# Patient Record
Sex: FEMALE | Race: BLACK OR AFRICAN AMERICAN | NOT HISPANIC OR LATINO
[De-identification: names, ages, dates, MRNs, and addresses within clinical notes are randomized per-mention and may not be internally consistent; named-entity substitution may affect disease eponyms.]

---

## 2017-12-27 ENCOUNTER — RESULT REVIEW (OUTPATIENT)
Age: 27
End: 2017-12-27

## 2018-07-25 ENCOUNTER — OUTPATIENT (OUTPATIENT)
Dept: OUTPATIENT SERVICES | Facility: HOSPITAL | Age: 28
LOS: 1 days | End: 2018-07-25
Payer: MEDICAID

## 2018-07-25 DIAGNOSIS — O26.899 OTHER SPECIFIED PREGNANCY RELATED CONDITIONS, UNSPECIFIED TRIMESTER: ICD-10-CM

## 2018-07-25 DIAGNOSIS — Z3A.00 WEEKS OF GESTATION OF PREGNANCY NOT SPECIFIED: ICD-10-CM

## 2018-07-25 PROCEDURE — 59025 FETAL NON-STRESS TEST: CPT | Mod: 26

## 2018-07-25 PROCEDURE — 99201 OFFICE OUTPATIENT NEW 10 MINUTES: CPT

## 2018-07-26 ENCOUNTER — INPATIENT (INPATIENT)
Facility: HOSPITAL | Age: 28
LOS: 1 days | Discharge: HOME | End: 2018-07-28
Attending: OBSTETRICS & GYNECOLOGY | Admitting: OBSTETRICS & GYNECOLOGY

## 2018-07-26 VITALS — WEIGHT: 189.6 LBS | HEIGHT: 67 IN

## 2018-07-26 DIAGNOSIS — O26.90 PREGNANCY RELATED CONDITIONS, UNSPECIFIED, UNSPECIFIED TRIMESTER: ICD-10-CM

## 2018-07-26 DIAGNOSIS — Z3A.00 WEEKS OF GESTATION OF PREGNANCY NOT SPECIFIED: ICD-10-CM

## 2018-07-26 LAB
ALBUMIN SERPL ELPH-MCNC: 3.6 G/DL — SIGNIFICANT CHANGE UP (ref 3.3–5)
ALP SERPL-CCNC: 163 U/L — HIGH (ref 40–120)
ALT FLD-CCNC: 6 U/L — SIGNIFICANT CHANGE UP (ref 4–33)
APTT BLD: 27.1 SEC — LOW (ref 27.5–37.4)
AST SERPL-CCNC: 23 U/L — SIGNIFICANT CHANGE UP (ref 4–32)
BASOPHILS # BLD AUTO: 0.03 K/UL — SIGNIFICANT CHANGE UP (ref 0–0.2)
BASOPHILS # BLD AUTO: 0.05 K/UL — SIGNIFICANT CHANGE UP (ref 0–0.2)
BASOPHILS NFR BLD AUTO: 0.2 % — SIGNIFICANT CHANGE UP (ref 0–2)
BASOPHILS NFR BLD AUTO: 0.4 % — SIGNIFICANT CHANGE UP (ref 0–2)
BILIRUB SERPL-MCNC: 0.3 MG/DL — SIGNIFICANT CHANGE UP (ref 0.2–1.2)
BLD GP AB SCN SERPL QL: NEGATIVE — SIGNIFICANT CHANGE UP
BUN SERPL-MCNC: 9 MG/DL — SIGNIFICANT CHANGE UP (ref 7–23)
CALCIUM SERPL-MCNC: 9.2 MG/DL — SIGNIFICANT CHANGE UP (ref 8.4–10.5)
CHLORIDE SERPL-SCNC: 100 MMOL/L — SIGNIFICANT CHANGE UP (ref 98–107)
CO2 SERPL-SCNC: 22 MMOL/L — SIGNIFICANT CHANGE UP (ref 22–31)
CREAT ?TM UR-MCNC: 66.3 MG/DL — SIGNIFICANT CHANGE UP
CREAT SERPL-MCNC: 0.67 MG/DL — SIGNIFICANT CHANGE UP (ref 0.5–1.3)
EOSINOPHIL # BLD AUTO: 0 K/UL — SIGNIFICANT CHANGE UP (ref 0–0.5)
EOSINOPHIL # BLD AUTO: 0 K/UL — SIGNIFICANT CHANGE UP (ref 0–0.5)
EOSINOPHIL NFR BLD AUTO: 0 % — SIGNIFICANT CHANGE UP (ref 0–6)
EOSINOPHIL NFR BLD AUTO: 0 % — SIGNIFICANT CHANGE UP (ref 0–6)
FIBRINOGEN PPP-MCNC: 654 MG/DL — HIGH (ref 310–510)
GLUCOSE SERPL-MCNC: 77 MG/DL — SIGNIFICANT CHANGE UP (ref 70–99)
HCT VFR BLD CALC: 35.4 % — SIGNIFICANT CHANGE UP (ref 34.5–45)
HCT VFR BLD CALC: 36.6 % — SIGNIFICANT CHANGE UP (ref 34.5–45)
HGB BLD-MCNC: 11.9 G/DL — SIGNIFICANT CHANGE UP (ref 11.5–15.5)
HGB BLD-MCNC: 12.5 G/DL — SIGNIFICANT CHANGE UP (ref 11.5–15.5)
IMM GRANULOCYTES # BLD AUTO: 0.2 # — SIGNIFICANT CHANGE UP
IMM GRANULOCYTES # BLD AUTO: 0.35 # — SIGNIFICANT CHANGE UP
IMM GRANULOCYTES NFR BLD AUTO: 1.6 % — HIGH (ref 0–1.5)
IMM GRANULOCYTES NFR BLD AUTO: 3 % — HIGH (ref 0–1.5)
INR BLD: 0.92 — SIGNIFICANT CHANGE UP (ref 0.88–1.17)
LDH SERPL L TO P-CCNC: 211 U/L — SIGNIFICANT CHANGE UP (ref 135–225)
LYMPHOCYTES # BLD AUTO: 0.87 K/UL — LOW (ref 1–3.3)
LYMPHOCYTES # BLD AUTO: 1.02 K/UL — SIGNIFICANT CHANGE UP (ref 1–3.3)
LYMPHOCYTES # BLD AUTO: 7.1 % — LOW (ref 13–44)
LYMPHOCYTES # BLD AUTO: 8.7 % — LOW (ref 13–44)
MCHC RBC-ENTMCNC: 33.6 % — SIGNIFICANT CHANGE UP (ref 32–36)
MCHC RBC-ENTMCNC: 33.8 PG — SIGNIFICANT CHANGE UP (ref 27–34)
MCHC RBC-ENTMCNC: 34.2 % — SIGNIFICANT CHANGE UP (ref 32–36)
MCHC RBC-ENTMCNC: 35.2 PG — HIGH (ref 27–34)
MCV RBC AUTO: 100.6 FL — HIGH (ref 80–100)
MCV RBC AUTO: 103.1 FL — HIGH (ref 80–100)
MONOCYTES # BLD AUTO: 0.65 K/UL — SIGNIFICANT CHANGE UP (ref 0–0.9)
MONOCYTES # BLD AUTO: 0.72 K/UL — SIGNIFICANT CHANGE UP (ref 0–0.9)
MONOCYTES NFR BLD AUTO: 5.3 % — SIGNIFICANT CHANGE UP (ref 2–14)
MONOCYTES NFR BLD AUTO: 6.1 % — SIGNIFICANT CHANGE UP (ref 2–14)
NEUTROPHILS # BLD AUTO: 10.5 K/UL — HIGH (ref 1.8–7.4)
NEUTROPHILS # BLD AUTO: 9.59 K/UL — HIGH (ref 1.8–7.4)
NEUTROPHILS NFR BLD AUTO: 81.8 % — HIGH (ref 43–77)
NEUTROPHILS NFR BLD AUTO: 85.8 % — HIGH (ref 43–77)
NRBC # FLD: 0 — SIGNIFICANT CHANGE UP
NRBC # FLD: 0 — SIGNIFICANT CHANGE UP
PLATELET # BLD AUTO: 162 K/UL — SIGNIFICANT CHANGE UP (ref 150–400)
PLATELET # BLD AUTO: 162 K/UL — SIGNIFICANT CHANGE UP (ref 150–400)
PMV BLD: 11.5 FL — SIGNIFICANT CHANGE UP (ref 7–13)
PMV BLD: 11.6 FL — SIGNIFICANT CHANGE UP (ref 7–13)
POTASSIUM SERPL-MCNC: 3.6 MMOL/L — SIGNIFICANT CHANGE UP (ref 3.5–5.3)
POTASSIUM SERPL-SCNC: 3.6 MMOL/L — SIGNIFICANT CHANGE UP (ref 3.5–5.3)
PROT SERPL-MCNC: 6.9 G/DL — SIGNIFICANT CHANGE UP (ref 6–8.3)
PROT UR-MCNC: 21 MG/DL — SIGNIFICANT CHANGE UP
PROTHROM AB SERPL-ACNC: 10.2 SEC — SIGNIFICANT CHANGE UP (ref 9.8–13.1)
RBC # BLD: 3.52 M/UL — LOW (ref 3.8–5.2)
RBC # BLD: 3.55 M/UL — LOW (ref 3.8–5.2)
RBC # FLD: 13.4 % — SIGNIFICANT CHANGE UP (ref 10.3–14.5)
RBC # FLD: 13.4 % — SIGNIFICANT CHANGE UP (ref 10.3–14.5)
RH IG SCN BLD-IMP: POSITIVE — SIGNIFICANT CHANGE UP
RH IG SCN BLD-IMP: POSITIVE — SIGNIFICANT CHANGE UP
SODIUM SERPL-SCNC: 139 MMOL/L — SIGNIFICANT CHANGE UP (ref 135–145)
T PALLIDUM AB TITR SER: NEGATIVE — SIGNIFICANT CHANGE UP
URATE SERPL-MCNC: 5.1 MG/DL — SIGNIFICANT CHANGE UP (ref 2.5–7)
WBC # BLD: 11.73 K/UL — HIGH (ref 3.8–10.5)
WBC # BLD: 12.25 K/UL — HIGH (ref 3.8–10.5)
WBC # FLD AUTO: 11.73 K/UL — HIGH (ref 3.8–10.5)
WBC # FLD AUTO: 12.25 K/UL — HIGH (ref 3.8–10.5)

## 2018-07-26 RX ORDER — DOCUSATE SODIUM 100 MG
100 CAPSULE ORAL
Qty: 0 | Refills: 0 | Status: DISCONTINUED | OUTPATIENT
Start: 2018-07-26 | End: 2018-07-28

## 2018-07-26 RX ORDER — OXYTOCIN 10 UNIT/ML
333.33 VIAL (ML) INJECTION
Qty: 20 | Refills: 0 | Status: COMPLETED | OUTPATIENT
Start: 2018-07-26 | End: 2018-07-26

## 2018-07-26 RX ORDER — PRAMOXINE HYDROCHLORIDE 150 MG/15G
1 AEROSOL, FOAM RECTAL EVERY 4 HOURS
Qty: 0 | Refills: 0 | Status: DISCONTINUED | OUTPATIENT
Start: 2018-07-26 | End: 2018-07-26

## 2018-07-26 RX ORDER — SODIUM CHLORIDE 9 MG/ML
1000 INJECTION, SOLUTION INTRAVENOUS ONCE
Qty: 0 | Refills: 0 | Status: COMPLETED | OUTPATIENT
Start: 2018-07-26 | End: 2018-07-26

## 2018-07-26 RX ORDER — HYDROCORTISONE 1 %
1 OINTMENT (GRAM) TOPICAL EVERY 4 HOURS
Qty: 0 | Refills: 0 | Status: DISCONTINUED | OUTPATIENT
Start: 2018-07-26 | End: 2018-07-27

## 2018-07-26 RX ORDER — SODIUM CHLORIDE 9 MG/ML
1000 INJECTION, SOLUTION INTRAVENOUS
Qty: 0 | Refills: 0 | Status: DISCONTINUED | OUTPATIENT
Start: 2018-07-26 | End: 2018-07-26

## 2018-07-26 RX ORDER — OXYTOCIN 10 UNIT/ML
41.67 VIAL (ML) INJECTION
Qty: 20 | Refills: 0 | Status: DISCONTINUED | OUTPATIENT
Start: 2018-07-26 | End: 2018-07-26

## 2018-07-26 RX ORDER — HYDROCORTISONE 1 %
1 OINTMENT (GRAM) TOPICAL EVERY 4 HOURS
Qty: 0 | Refills: 0 | Status: DISCONTINUED | OUTPATIENT
Start: 2018-07-26 | End: 2018-07-26

## 2018-07-26 RX ORDER — MAGNESIUM HYDROXIDE 400 MG/1
30 TABLET, CHEWABLE ORAL
Qty: 0 | Refills: 0 | Status: DISCONTINUED | OUTPATIENT
Start: 2018-07-26 | End: 2018-07-28

## 2018-07-26 RX ORDER — DIBUCAINE 1 %
1 OINTMENT (GRAM) RECTAL EVERY 4 HOURS
Qty: 0 | Refills: 0 | Status: DISCONTINUED | OUTPATIENT
Start: 2018-07-26 | End: 2018-07-28

## 2018-07-26 RX ORDER — ACETAMINOPHEN 500 MG
975 TABLET ORAL EVERY 6 HOURS
Qty: 0 | Refills: 0 | Status: DISCONTINUED | OUTPATIENT
Start: 2018-07-26 | End: 2018-07-28

## 2018-07-26 RX ORDER — CITRIC ACID/SODIUM CITRATE 300-500 MG
15 SOLUTION, ORAL ORAL EVERY 4 HOURS
Qty: 0 | Refills: 0 | Status: DISCONTINUED | OUTPATIENT
Start: 2018-07-26 | End: 2018-07-26

## 2018-07-26 RX ORDER — ACETAMINOPHEN 500 MG
975 TABLET ORAL EVERY 6 HOURS
Qty: 0 | Refills: 0 | Status: COMPLETED | OUTPATIENT
Start: 2018-07-26 | End: 2019-06-24

## 2018-07-26 RX ORDER — FAMOTIDINE 10 MG/ML
20 INJECTION INTRAVENOUS ONCE
Qty: 0 | Refills: 0 | Status: COMPLETED | OUTPATIENT
Start: 2018-07-26 | End: 2018-07-26

## 2018-07-26 RX ORDER — IBUPROFEN 200 MG
600 TABLET ORAL EVERY 6 HOURS
Qty: 0 | Refills: 0 | Status: COMPLETED | OUTPATIENT
Start: 2018-07-26 | End: 2019-06-24

## 2018-07-26 RX ORDER — OXYTOCIN 10 UNIT/ML
333.33 VIAL (ML) INJECTION
Qty: 20 | Refills: 0 | Status: COMPLETED | OUTPATIENT
Start: 2018-07-26

## 2018-07-26 RX ORDER — TETANUS TOXOID, REDUCED DIPHTHERIA TOXOID AND ACELLULAR PERTUSSIS VACCINE, ADSORBED 5; 2.5; 8; 8; 2.5 [IU]/.5ML; [IU]/.5ML; UG/.5ML; UG/.5ML; UG/.5ML
0.5 SUSPENSION INTRAMUSCULAR ONCE
Qty: 0 | Refills: 0 | Status: DISCONTINUED | OUTPATIENT
Start: 2018-07-26 | End: 2018-07-28

## 2018-07-26 RX ORDER — OXYCODONE HYDROCHLORIDE 5 MG/1
5 TABLET ORAL EVERY 4 HOURS
Qty: 0 | Refills: 0 | Status: DISCONTINUED | OUTPATIENT
Start: 2018-07-26 | End: 2018-07-28

## 2018-07-26 RX ORDER — LANOLIN
1 OINTMENT (GRAM) TOPICAL EVERY 6 HOURS
Qty: 0 | Refills: 0 | Status: DISCONTINUED | OUTPATIENT
Start: 2018-07-26 | End: 2018-07-28

## 2018-07-26 RX ORDER — PRAMOXINE HYDROCHLORIDE 150 MG/15G
1 AEROSOL, FOAM RECTAL EVERY 4 HOURS
Qty: 0 | Refills: 0 | Status: DISCONTINUED | OUTPATIENT
Start: 2018-07-26 | End: 2018-07-27

## 2018-07-26 RX ORDER — SODIUM CHLORIDE 9 MG/ML
3 INJECTION INTRAMUSCULAR; INTRAVENOUS; SUBCUTANEOUS EVERY 8 HOURS
Qty: 0 | Refills: 0 | Status: DISCONTINUED | OUTPATIENT
Start: 2018-07-26 | End: 2018-07-26

## 2018-07-26 RX ORDER — DIBUCAINE 1 %
1 OINTMENT (GRAM) RECTAL EVERY 4 HOURS
Qty: 0 | Refills: 0 | Status: DISCONTINUED | OUTPATIENT
Start: 2018-07-26 | End: 2018-07-26

## 2018-07-26 RX ORDER — IBUPROFEN 200 MG
600 TABLET ORAL EVERY 6 HOURS
Qty: 0 | Refills: 0 | Status: DISCONTINUED | OUTPATIENT
Start: 2018-07-26 | End: 2018-07-28

## 2018-07-26 RX ORDER — SODIUM CHLORIDE 9 MG/ML
3 INJECTION INTRAMUSCULAR; INTRAVENOUS; SUBCUTANEOUS EVERY 8 HOURS
Qty: 0 | Refills: 0 | Status: DISCONTINUED | OUTPATIENT
Start: 2018-07-26 | End: 2018-07-28

## 2018-07-26 RX ORDER — AER TRAVELER 0.5 G/1
1 SOLUTION RECTAL; TOPICAL EVERY 4 HOURS
Qty: 0 | Refills: 0 | Status: DISCONTINUED | OUTPATIENT
Start: 2018-07-26 | End: 2018-07-28

## 2018-07-26 RX ORDER — SODIUM CHLORIDE 9 MG/ML
1000 INJECTION INTRAMUSCULAR; INTRAVENOUS; SUBCUTANEOUS
Qty: 0 | Refills: 0 | Status: DISCONTINUED | OUTPATIENT
Start: 2018-07-26 | End: 2018-07-27

## 2018-07-26 RX ORDER — KETOROLAC TROMETHAMINE 30 MG/ML
30 SYRINGE (ML) INJECTION ONCE
Qty: 0 | Refills: 0 | Status: DISCONTINUED | OUTPATIENT
Start: 2018-07-26 | End: 2018-07-26

## 2018-07-26 RX ORDER — GLYCERIN ADULT
1 SUPPOSITORY, RECTAL RECTAL AT BEDTIME
Qty: 0 | Refills: 0 | Status: DISCONTINUED | OUTPATIENT
Start: 2018-07-26 | End: 2018-07-28

## 2018-07-26 RX ORDER — DIPHENHYDRAMINE HCL 50 MG
25 CAPSULE ORAL EVERY 6 HOURS
Qty: 0 | Refills: 0 | Status: DISCONTINUED | OUTPATIENT
Start: 2018-07-26 | End: 2018-07-28

## 2018-07-26 RX ORDER — AER TRAVELER 0.5 G/1
1 SOLUTION RECTAL; TOPICAL EVERY 4 HOURS
Qty: 0 | Refills: 0 | Status: DISCONTINUED | OUTPATIENT
Start: 2018-07-26 | End: 2018-07-26

## 2018-07-26 RX ORDER — SIMETHICONE 80 MG/1
80 TABLET, CHEWABLE ORAL EVERY 6 HOURS
Qty: 0 | Refills: 0 | Status: DISCONTINUED | OUTPATIENT
Start: 2018-07-26 | End: 2018-07-28

## 2018-07-26 RX ORDER — OXYCODONE HYDROCHLORIDE 5 MG/1
5 TABLET ORAL
Qty: 0 | Refills: 0 | Status: DISCONTINUED | OUTPATIENT
Start: 2018-07-26 | End: 2018-07-28

## 2018-07-26 RX ADMIN — Medication 600 MILLIGRAM(S): at 21:50

## 2018-07-26 RX ADMIN — SODIUM CHLORIDE 2000 MILLILITER(S): 9 INJECTION, SOLUTION INTRAVENOUS at 03:00

## 2018-07-26 RX ADMIN — Medication 1000 MILLIUNIT(S)/MIN: at 13:56

## 2018-07-26 RX ADMIN — Medication 15 MILLILITER(S): at 05:47

## 2018-07-26 RX ADMIN — Medication 975 MILLIGRAM(S): at 21:27

## 2018-07-26 RX ADMIN — Medication 600 MILLIGRAM(S): at 21:27

## 2018-07-26 RX ADMIN — Medication 125 MILLIUNIT(S)/MIN: at 13:57

## 2018-07-26 RX ADMIN — SODIUM CHLORIDE 125 MILLILITER(S): 9 INJECTION, SOLUTION INTRAVENOUS at 05:47

## 2018-07-26 RX ADMIN — FAMOTIDINE 20 MILLIGRAM(S): 10 INJECTION INTRAVENOUS at 08:40

## 2018-07-26 RX ADMIN — Medication 975 MILLIGRAM(S): at 21:50

## 2018-07-26 RX ADMIN — Medication 30 MILLIGRAM(S): at 15:19

## 2018-07-27 ENCOUNTER — TRANSCRIPTION ENCOUNTER (OUTPATIENT)
Age: 28
End: 2018-07-27

## 2018-07-27 RX ORDER — PRAMOXINE HYDROCHLORIDE 150 MG/15G
1 AEROSOL, FOAM RECTAL EVERY 4 HOURS
Qty: 0 | Refills: 0 | Status: DISCONTINUED | OUTPATIENT
Start: 2018-07-27 | End: 2018-07-28

## 2018-07-27 RX ORDER — HYDROCORTISONE 1 %
1 OINTMENT (GRAM) TOPICAL EVERY 4 HOURS
Qty: 0 | Refills: 0 | Status: DISCONTINUED | OUTPATIENT
Start: 2018-07-27 | End: 2018-07-28

## 2018-07-27 RX ADMIN — Medication 600 MILLIGRAM(S): at 18:10

## 2018-07-27 RX ADMIN — Medication 600 MILLIGRAM(S): at 05:01

## 2018-07-27 RX ADMIN — Medication 975 MILLIGRAM(S): at 11:32

## 2018-07-27 RX ADMIN — SODIUM CHLORIDE 3 MILLILITER(S): 9 INJECTION INTRAMUSCULAR; INTRAVENOUS; SUBCUTANEOUS at 21:37

## 2018-07-27 RX ADMIN — Medication 975 MILLIGRAM(S): at 05:40

## 2018-07-27 RX ADMIN — Medication 975 MILLIGRAM(S): at 05:01

## 2018-07-27 RX ADMIN — Medication 975 MILLIGRAM(S): at 19:00

## 2018-07-27 RX ADMIN — Medication 600 MILLIGRAM(S): at 19:00

## 2018-07-27 RX ADMIN — SODIUM CHLORIDE 3 MILLILITER(S): 9 INJECTION INTRAMUSCULAR; INTRAVENOUS; SUBCUTANEOUS at 06:00

## 2018-07-27 RX ADMIN — Medication 975 MILLIGRAM(S): at 18:10

## 2018-07-27 RX ADMIN — Medication 975 MILLIGRAM(S): at 12:00

## 2018-07-27 RX ADMIN — Medication 600 MILLIGRAM(S): at 05:40

## 2018-07-27 RX ADMIN — Medication 600 MILLIGRAM(S): at 12:00

## 2018-07-27 RX ADMIN — Medication 600 MILLIGRAM(S): at 11:33

## 2018-07-27 NOTE — PROGRESS NOTE ADULT - ASSESSMENT
A: 28 year old, postpartum day #1 following  doing well.   -Continue routine postpartum care.  -Discharge home on postpartum day #2.    CAMDEN Casas MD

## 2018-07-27 NOTE — DISCHARGE NOTE OB - CARE PROVIDER_API CALL
Mandy Westbrook), Obstetrics and Gynecology  [address]  [address]  [place] [address]  Phone: [phone]  Fax: [phone]

## 2018-07-27 NOTE — DISCHARGE NOTE OB - PATIENT PORTAL LINK FT
You can access the [employer] (MM)[facility] Patient Portal, offered by [facility], by registering with the following website: [email or web address]

## 2018-07-27 NOTE — DISCHARGE NOTE OB - MEDICATION SUMMARY - MEDICATIONS TO TAKE
I will START or STAY ON the medications listed below when I get home from the hospital:    acetaminophen 325 mg oral tablet  -- 3 tab(s) by mouth every 6 hours, As Needed  -- Indication: For Vaginal delivery    ibuprofen 600 mg oral tablet  -- 1 tab(s) by mouth every 6 hours, As Needed  -- Indication: For Vaginal delivery    Prenatal Multivitamins with Folic Acid 1 mg oral tablet  -- 1 tab(s) by mouth once a day  -- Indication: For Vaginal delivery

## 2018-07-27 NOTE — PROGRESS NOTE ADULT - SUBJECTIVE AND OBJECTIVE BOX
Anesthesia Post-op Note    POD#1 S/P     Patient is doing well.  OOBAA. Tolerating clears.  Pain is tolerable. Denies HA. Denies numbness/tingliness/pain in LE.  No residual anesthetic issues or complications noted.  T(C): 36.7 (18 @ 06:20), Max: 37 (18 @ 12:43)  HR: 81 (18 @ 06:20) (81 - 109)  BP: 121/85 (18 @ 06:20) (108/64 - 132/86)  RR: 16 (18 @ 06:20) (16 - 18)  SpO2: 97% (18 @ 06:20) (97% - 100%)

## 2018-07-27 NOTE — PROGRESS NOTE ADULT - SUBJECTIVE AND OBJECTIVE BOX
S: Patient doing well.   Pain controlled.  Out of bed.  Voiding.  Tolerating oral intakd.  Lochia within normal limits.    O: Vital Signs Last 24 Hours  T(C): 36.7 (27 Jul 2018 06:20), Max: 37 (26 Jul 2018 12:43)  T(F): 98.1 (27 Jul 2018 06:20), Max: 98.6 (26 Jul 2018 12:43)  HR: 81 (27 Jul 2018 06:20) (81 - 109)  BP: 121/85 (27 Jul 2018 06:20) (108/64 - 132/86)  BP(mean): --  RR: 16 (27 Jul 2018 06:20) (16 - 18)  SpO2: 97% (27 Jul 2018 06:20) (97% - 100%)    General: doing well, appears in no distress  Abdomen: soft, nontender, not distended.    Fundus firm below umbilicus, nontender.  Extremities: no tenderness, negative Chris's sign    Labs:                        12.5   12.25 )-----------( 162      ( 26 Jul 2018 08:07 )             36.6       ABO Interpretation: AB (07-26 @ 03:07)    Rh Interpretation: Positive (07-26 @ 03:07)    Antibody Screen Negative          . S: Patient doing well.   Pain controlled.  Out of bed.  Voiding.  Tolerating oral intake.  Lochia within normal limits.    O: Vital Signs Last 24 Hours  T(C): 36.7 (27 Jul 2018 06:20), Max: 37 (26 Jul 2018 12:43)  T(F): 98.1 (27 Jul 2018 06:20), Max: 98.6 (26 Jul 2018 12:43)  HR: 81 (27 Jul 2018 06:20) (81 - 109)  BP: 121/85 (27 Jul 2018 06:20) (108/64 - 132/86)  BP(mean): --  RR: 16 (27 Jul 2018 06:20) (16 - 18)  SpO2: 97% (27 Jul 2018 06:20) (97% - 100%)    General: doing well, appears in no distress  Abdomen: soft, nontender, not distended.    Fundus firm below umbilicus, nontender.  Extremities: no tenderness, negative Chris's sign    Labs:                        12.5   12.25 )-----------( 162      ( 26 Jul 2018 08:07 )             36.6       ABO Interpretation: AB (07-26 @ 03:07)    Rh Interpretation: Positive (07-26 @ 03:07)    Antibody Screen Negative          .

## 2018-07-27 NOTE — DISCHARGE NOTE OB - CARE PLAN
Principal Discharge DX:	Vaginal delivery  Goal:	normal recovery  Assessment and plan of treatment:	normal delivery. mom has uneventful pospartum course, baby to NICU

## 2018-07-28 VITALS
SYSTOLIC BLOOD PRESSURE: 127 MMHG | DIASTOLIC BLOOD PRESSURE: 88 MMHG | OXYGEN SATURATION: 100 % | RESPIRATION RATE: 20 BRPM | HEART RATE: 94 BPM | TEMPERATURE: 99 F

## 2018-07-28 RX ORDER — ACETAMINOPHEN 500 MG
3 TABLET ORAL
Qty: 0 | Refills: 0 | DISCHARGE
Start: 2018-07-28

## 2018-07-28 RX ORDER — IBUPROFEN 200 MG
1 TABLET ORAL
Qty: 0 | Refills: 0 | DISCHARGE
Start: 2018-07-28

## 2018-07-28 RX ADMIN — Medication 600 MILLIGRAM(S): at 02:31

## 2018-07-28 RX ADMIN — Medication 975 MILLIGRAM(S): at 01:31

## 2018-07-28 RX ADMIN — Medication 600 MILLIGRAM(S): at 01:31

## 2018-07-28 RX ADMIN — Medication 600 MILLIGRAM(S): at 06:12

## 2018-07-28 RX ADMIN — Medication 975 MILLIGRAM(S): at 07:11

## 2018-07-28 RX ADMIN — Medication 600 MILLIGRAM(S): at 07:11

## 2018-07-28 RX ADMIN — Medication 975 MILLIGRAM(S): at 02:31

## 2018-07-28 RX ADMIN — Medication 975 MILLIGRAM(S): at 06:11

## 2018-07-28 NOTE — PROGRESS NOTE ADULT - SUBJECTIVE AND OBJECTIVE BOX
OB Progress Note:  PPD#2    S: 29yo PPD#2 s/p  with normal EBL. Patient feels well. Pain is well controlled. She is tolerating a regular diet, voiding spontaneously, and ambulating without difficulty. Denies lightheadedness/dizziness. Denies N/V.     O:  Vitals:   Vital Signs Last 24 Hrs  T(C): 37 (2018 06:14), Max: 37 (2018 06:14)  T(F): 98.6 (2018 06:14), Max: 98.6 (2018 06:14)  HR: 94 (2018 06:14) (94 - 98)  BP: 127/88 (2018 06:14) (127/88 - 130/87)  BP(mean): --  RR: 20 (2018 06:14) (18 - 20)  SpO2: 100% (2018 06:14) (100% - 100%)    MEDICATIONS  (STANDING):  acetaminophen   Tablet. 975 milliGRAM(s) Oral every 6 hours  diphtheria/tetanus/pertussis (acellular) Vaccine (ADAcel) 0.5 milliLiter(s) IntraMuscular once  ibuprofen  Tablet 600 milliGRAM(s) Oral every 6 hours  oxyCODONE    IR 5 milliGRAM(s) Oral every 3 hours  prenatal multivitamin 1 Tablet(s) Oral daily  sodium chloride 0.9% lock flush 3 milliLiter(s) IV Push every 8 hours    MEDICATIONS  (PRN):  dibucaine 1% Ointment 1 Application(s) Topical every 4 hours PRN Perineal Discomfort  diphenhydrAMINE   Capsule 25 milliGRAM(s) Oral every 6 hours PRN Itching  docusate sodium 100 milliGRAM(s) Oral two times a day PRN Stool Softening  glycerin Suppository - Adult 1 Suppository(s) Rectal at bedtime PRN Constipation  hydrocortisone 1% Cream 1 Application(s) Topical every 4 hours PRN perineal discomfort  lanolin Ointment 1 Application(s) Topical every 6 hours PRN Sore Nipples  magnesium hydroxide Suspension 30 milliLiter(s) Oral two times a day PRN Constipation  oxyCODONE    IR 5 milliGRAM(s) Oral every 4 hours PRN Severe Pain (7 -10)  pramoxine 1%/zinc 5% Cream 1 Application(s) Topical every 4 hours PRN perineal discomfort  simethicone 80 milliGRAM(s) Chew every 6 hours PRN Gas  witch hazel Pads 1 Application(s) Topical every 4 hours PRN Perineal Discomfort      Labs:  Blood type: AB Positive  Rubella IgG: RPR: Negative                          12.5   12.25<H> >-----------< 162    (  @ 08:07 )             36.6                        11.9   11.73<H> >-----------< 162    (  @ 03:00 )             35.4    18 @ 08:07      139  |  100  |  9   ----------------------------<  77  3.6   |  22  |  0.67        Ca    9.2      2018 08:07    TPro  6.9  /  Alb  3.6  /  TBili  0.3  /  DBili  x   /  AST  23  /  ALT  6   /  AlkPhos  163<H>  18 @ 08:07      Physical Exam:  General: NAD  Abdomen: soft, non-tender, non-distended, fundus firm  Vaginal: Lochia wnl  Extremities: No erythema/edema

## 2018-07-28 NOTE — PROGRESS NOTE ADULT - SUBJECTIVE AND OBJECTIVE BOX
S: 29yo PPD#2 s/p  with PEC.  Hellp labs negative (). Patient feels well. Pain is well controlled. She is tolerating a regular diet and passing flatus. She is voiding spontaneously, and ambulating without difficulty. Denies CP/SOB. Denies lightheadedness/dizziness. Denies N/V.  Patient denies headaches, blurred vision, or epigastric pain.    O:  Vitals:   Vital Signs Last 24 Hrs  T(C): 37 (2018 06:14), Max: 37 (2018 06:14)  T(F): 98.6 (2018 06:14), Max: 98.6 (2018 06:14)  HR: 94 (2018 06:14) (94 - 98)  BP: 127/88 (2018 06:14) (127/88 - 130/87)  BP(mean): --  RR: 20 (2018 06:14) (18 - 20)  SpO2: 100% (2018 06:14) (100% - 100%)    MEDICATIONS  (STANDING):  acetaminophen   Tablet. 975 milliGRAM(s) Oral every 6 hours  diphtheria/tetanus/pertussis (acellular) Vaccine (ADAcel) 0.5 milliLiter(s) IntraMuscular once  ibuprofen  Tablet 600 milliGRAM(s) Oral every 6 hours  oxyCODONE    IR 5 milliGRAM(s) Oral every 3 hours  prenatal multivitamin 1 Tablet(s) Oral daily  sodium chloride 0.9% lock flush 3 milliLiter(s) IV Push every 8 hours    MEDICATIONS  (PRN):  dibucaine 1% Ointment 1 Application(s) Topical every 4 hours PRN Perineal Discomfort  diphenhydrAMINE   Capsule 25 milliGRAM(s) Oral every 6 hours PRN Itching  docusate sodium 100 milliGRAM(s) Oral two times a day PRN Stool Softening  glycerin Suppository - Adult 1 Suppository(s) Rectal at bedtime PRN Constipation  hydrocortisone 1% Cream 1 Application(s) Topical every 4 hours PRN perineal discomfort  lanolin Ointment 1 Application(s) Topical every 6 hours PRN Sore Nipples  magnesium hydroxide Suspension 30 milliLiter(s) Oral two times a day PRN Constipation  oxyCODONE    IR 5 milliGRAM(s) Oral every 4 hours PRN Severe Pain (7 -10)  pramoxine 1%/zinc 5% Cream 1 Application(s) Topical every 4 hours PRN perineal discomfort  simethicone 80 milliGRAM(s) Chew every 6 hours PRN Gas  witch hazel Pads 1 Application(s) Topical every 4 hours PRN Perineal Discomfort      Labs:  Blood type: AB Positive  Rubella IgG: RPR: Negative                          12.5   12.25<H> >-----------< 162    (  @ 08:07 )             36.6                        11.9   11.73<H> >-----------< 162    (  @ 03:00 )             35.4    18 @ 08:07      139  |  100  |  9   ----------------------------<  77  3.6   |  22  |  0.67        Ca    9.2      2018 08:07    TPro  6.9  /  Alb  3.6  /  TBili  0.3  /  DBili  x   /  AST  23  /  ALT  6   /  AlkPhos  163<H>  18 @ 08:07      Physical Exam:  General: NAD  Abdomen: soft, non-tender, non-distended, fundus firm  Vaginal: Lochia wnl  Extremities: No erythema/edema    A/P: 29yo PPD#2 s/p .  Patient is stable and doing well post-partum.  History of PEC.  Hellp labs negative (). Patient denies headaches, blurred vision, or epigastric pain.      - Pain well controlled, continue current pain regimen  - Increase ambulation, SCDs when not ambulating  - Continue regular diet  - Discharge planned for today.    -Lillian ANDERS-BC

## 2018-07-28 NOTE — PROGRESS NOTE ADULT - PROBLEM SELECTOR PLAN 1
- Pain well controlled, continue current pain regimen  - Increase ambulation, SCDs when not ambulating  - Continue regular diet  - Discharge planning     Belkis Jorgensen, PGY-1  Pager# [phone]

## 2018-10-12 ENCOUNTER — RESULT REVIEW (OUTPATIENT)
Age: 28
End: 2018-10-12

## 2020-02-21 NOTE — PATIENT PROFILE OB - RESUSCITATION EFFORTS, BABY A
Visit Information    Have you changed or started any medications since your last visit including any over-the-counter medicines, vitamins, or herbal medicines? no   Are you having any side effects from any of your medications? -  no  Have you stopped taking any of your medications? Is so, why? -  no    Have you seen any other physician or provider since your last visit? No  Have you had any other diagnostic tests since your last visit? No  Have you been seen in the emergency room and/or had an admission to a hospital since we last saw you? No  Have you had your routine dental cleaning in the past 6 months? no    Have you activated your [email or web address] account? If not, what are your barriers?  Yes     Patient Care Team:  Fredrick Randolph PA-C as PCP - General (Physician Assistant)  Fredrick Randolph PA-C as PCP - [facility]  Miriam Tsang MD as Consulting Physician (Urology)  Mary Bahena MD as Consulting Physician (Nephrology)    Medical History Review  Past Medical, Family, and Social History reviewed and does not contribute to the patient presenting condition    Health Maintenance   Topic Date Due    Lipid screen  04/17/2019    Annual Wellness Visit (AWV)  06/19/2019    DTaP/Tdap/Td vaccine (1 - Tdap) 01/14/2021 (Originally 4/24/1957)    Flu vaccine (1) 01/14/2021 (Originally 9/1/2019)    Shingles Vaccine (1 of 2) 01/14/2021 (Originally 4/24/1997)    Potassium monitoring  01/03/2021    Creatinine monitoring  01/03/2021    Colon cancer screen colonoscopy  04/17/2022    Pneumococcal 65+ years Vaccine  Completed    Hepatitis C screen  Completed    Hepatitis A vaccine  Aged Out    Hepatitis B vaccine  Aged Out    Hib vaccine  Aged Out    Meningococcal (ACWY) vaccine  Aged Out
Filippo Montano MD as Consulting Physician (Urology)  Drea Barnes MD as Consulting Physician (Nephrology)    Wt Readings from Last 3 Encounters:   02/21/20 202 lb 9.6 oz (91.9 kg)   01/14/20 203 lb 3.2 oz (92.2 kg)   01/03/20 201 lb (91.2 kg)     Vitals:    02/21/20 0910   BP: 134/79   Site: Left Upper Arm   Position: Sitting   Cuff Size: Medium Adult   Pulse: 95   Temp: 96.5 °F (35.8 °C)   TempSrc: Tympanic   Weight: 202 lb 9.6 oz (91.9 kg)   Height: 5' 9\" (1.753 m)     Body mass index is 29.92 kg/m². Based upon direct observation of the patient, evaluation of cognition reveals recent and remote memory intact. General Appearance: alert and oriented to person, place and time, well-developed and well-nourished, in no acute distress  Skin: warm and dry, no rash or erythema  Head: normocephalic and atraumatic  Eyes: pupils equal, round, and reactive to light and conjunctivae normal  ENT: hearing grossly normal bilaterally and oropharynx clear and moist with normal mucous membranes  Pulmonary/Chest: clear to auscultation bilaterally- no wheezes, rales or rhonchi, normal air movement, no respiratory distress  Cardiovascular: normal rate, normal S1 and S2 and no gallops  Abdomen: soft, non-tender and non-distended  Extremities: no cyanosis and no clubbing  Musculoskeletal: normal range of motion, no joint swelling, deformity or tenderness  Neurologic: gait and coordination normal and speech normal    Patient's complete Health Risk Assessment and screening values have been reviewed and are found in Flowsheets. The following problems were reviewed today and where indicated follow up appointments were made and/or referrals ordered. Patient is seen by urology, informed patient will obtain most recent note. Patient will see nephrology on 2/26/2020, informed patient will obtain most recent note. Inform patient labs will be ordered and have completed the next several weeks, patient voiced understanding.       Positive Risk
No

## 2020-04-26 ENCOUNTER — MESSAGE (OUTPATIENT)
Age: 30
End: 2020-04-26

## 2021-04-27 PROBLEM — Z00.00 ENCOUNTER FOR PREVENTIVE HEALTH EXAMINATION: Status: ACTIVE | Noted: 2021-04-27

## 2021-04-29 ENCOUNTER — APPOINTMENT (OUTPATIENT)
Dept: ANTEPARTUM | Facility: CLINIC | Age: 31
End: 2021-04-29
Payer: MEDICAID

## 2021-04-29 ENCOUNTER — ASOB RESULT (OUTPATIENT)
Age: 31
End: 2021-04-29

## 2021-04-29 PROCEDURE — 76813 OB US NUCHAL MEAS 1 GEST: CPT

## 2021-04-29 PROCEDURE — 99072 ADDL SUPL MATRL&STAF TM PHE: CPT

## 2021-05-04 LAB
1ST TRIMESTER DATA: NORMAL
ADDENDUM DOC: NORMAL
AFP PNL SERPL: NORMAL
AFP SERPL-ACNC: NORMAL
CLINICAL BIOCHEMIST REVIEW: NORMAL
FREE BETA HCG 1ST TRIMESTER: NORMAL
Lab: NORMAL
NOTES NTD: NORMAL
NT: NORMAL
PAPP-A SERPL-ACNC: NORMAL
TRISOMY 18/3: NORMAL

## 2021-06-30 ENCOUNTER — ASOB RESULT (OUTPATIENT)
Age: 31
End: 2021-06-30

## 2021-06-30 ENCOUNTER — APPOINTMENT (OUTPATIENT)
Dept: ANTEPARTUM | Facility: CLINIC | Age: 31
End: 2021-06-30
Payer: MEDICAID

## 2021-06-30 ENCOUNTER — TRANSCRIPTION ENCOUNTER (OUTPATIENT)
Age: 31
End: 2021-06-30

## 2021-06-30 PROCEDURE — 76817 TRANSVAGINAL US OBSTETRIC: CPT

## 2021-06-30 PROCEDURE — 76805 OB US >/= 14 WKS SNGL FETUS: CPT

## 2021-07-15 ENCOUNTER — ASOB RESULT (OUTPATIENT)
Age: 31
End: 2021-07-15

## 2021-07-15 ENCOUNTER — APPOINTMENT (OUTPATIENT)
Dept: ANTEPARTUM | Facility: CLINIC | Age: 31
End: 2021-07-15
Payer: MEDICAID

## 2021-07-15 PROCEDURE — 76817 TRANSVAGINAL US OBSTETRIC: CPT

## 2021-08-08 ENCOUNTER — OUTPATIENT (OUTPATIENT)
Dept: INPATIENT UNIT | Facility: HOSPITAL | Age: 31
LOS: 1 days | Discharge: HOME | End: 2021-08-08
Payer: MEDICAID

## 2021-08-08 ENCOUNTER — EMERGENCY (EMERGENCY)
Facility: HOSPITAL | Age: 31
LOS: 0 days | Discharge: HOME | End: 2021-08-08
Attending: EMERGENCY MEDICINE
Payer: MEDICAID

## 2021-08-08 VITALS
OXYGEN SATURATION: 100 % | TEMPERATURE: 98 F | HEART RATE: 81 BPM | DIASTOLIC BLOOD PRESSURE: 78 MMHG | RESPIRATION RATE: 18 BRPM | SYSTOLIC BLOOD PRESSURE: 117 MMHG

## 2021-08-08 VITALS — HEIGHT: 67 IN | WEIGHT: 197.09 LBS

## 2021-08-08 DIAGNOSIS — R06.02 SHORTNESS OF BREATH: ICD-10-CM

## 2021-08-08 DIAGNOSIS — O99.891 OTHER SPECIFIED DISEASES AND CONDITIONS COMPLICATING PREGNANCY: ICD-10-CM

## 2021-08-08 DIAGNOSIS — R42 DIZZINESS AND GIDDINESS: ICD-10-CM

## 2021-08-08 DIAGNOSIS — Z3A.27 27 WEEKS GESTATION OF PREGNANCY: ICD-10-CM

## 2021-08-08 DIAGNOSIS — R00.2 PALPITATIONS: ICD-10-CM

## 2021-08-08 DIAGNOSIS — O26.899 OTHER SPECIFIED PREGNANCY RELATED CONDITIONS, UNSPECIFIED TRIMESTER: ICD-10-CM

## 2021-08-08 LAB
ALBUMIN SERPL ELPH-MCNC: 3 G/DL — LOW (ref 3.3–5)
ALP SERPL-CCNC: 87 U/L — SIGNIFICANT CHANGE UP (ref 40–120)
ALT FLD-CCNC: 13 U/L — SIGNIFICANT CHANGE UP (ref 12–78)
ANION GAP SERPL CALC-SCNC: 6 MMOL/L — SIGNIFICANT CHANGE UP (ref 5–17)
APPEARANCE UR: ABNORMAL
APTT BLD: 26.3 SEC — LOW (ref 27.5–35.5)
AST SERPL-CCNC: 20 U/L — SIGNIFICANT CHANGE UP (ref 15–37)
BASOPHILS # BLD AUTO: 0.18 K/UL — SIGNIFICANT CHANGE UP (ref 0–0.2)
BASOPHILS NFR BLD AUTO: 2 % — SIGNIFICANT CHANGE UP (ref 0–2)
BILIRUB SERPL-MCNC: 0.2 MG/DL — SIGNIFICANT CHANGE UP (ref 0.2–1.2)
BILIRUB UR-MCNC: NEGATIVE — SIGNIFICANT CHANGE UP
BUN SERPL-MCNC: 5 MG/DL — LOW (ref 7–23)
CALCIUM SERPL-MCNC: 9.2 MG/DL — SIGNIFICANT CHANGE UP (ref 8.5–10.1)
CHLORIDE SERPL-SCNC: 110 MMOL/L — HIGH (ref 96–108)
CO2 SERPL-SCNC: 23 MMOL/L — SIGNIFICANT CHANGE UP (ref 22–31)
COLOR SPEC: YELLOW — SIGNIFICANT CHANGE UP
CREAT ?TM UR-MCNC: 88 MG/DL — SIGNIFICANT CHANGE UP
CREAT SERPL-MCNC: 0.46 MG/DL — LOW (ref 0.5–1.3)
DIFF PNL FLD: NEGATIVE — SIGNIFICANT CHANGE UP
EOSINOPHIL # BLD AUTO: 0 K/UL — SIGNIFICANT CHANGE UP (ref 0–0.5)
EOSINOPHIL NFR BLD AUTO: 0 % — SIGNIFICANT CHANGE UP (ref 0–6)
GLUCOSE SERPL-MCNC: 104 MG/DL — HIGH (ref 70–99)
GLUCOSE UR QL: 100 MG/DL
HCT VFR BLD CALC: 30 % — LOW (ref 34.5–45)
HGB BLD-MCNC: 10 G/DL — LOW (ref 11.5–15.5)
INR BLD: 0.97 RATIO — SIGNIFICANT CHANGE UP (ref 0.88–1.16)
KETONES UR-MCNC: ABNORMAL
LDH SERPL L TO P-CCNC: 262 U/L — HIGH (ref 84–241)
LEUKOCYTE ESTERASE UR-ACNC: NEGATIVE — SIGNIFICANT CHANGE UP
LYMPHOCYTES # BLD AUTO: 1.15 K/UL — SIGNIFICANT CHANGE UP (ref 1–3.3)
LYMPHOCYTES # BLD AUTO: 13 % — SIGNIFICANT CHANGE UP (ref 13–44)
MCHC RBC-ENTMCNC: 32.3 PG — SIGNIFICANT CHANGE UP (ref 27–34)
MCHC RBC-ENTMCNC: 33.3 GM/DL — SIGNIFICANT CHANGE UP (ref 32–36)
MCV RBC AUTO: 96.8 FL — SIGNIFICANT CHANGE UP (ref 80–100)
MONOCYTES # BLD AUTO: 0.44 K/UL — SIGNIFICANT CHANGE UP (ref 0–0.9)
MONOCYTES NFR BLD AUTO: 5 % — SIGNIFICANT CHANGE UP (ref 2–14)
NEUTROPHILS # BLD AUTO: 6.81 K/UL — SIGNIFICANT CHANGE UP (ref 1.8–7.4)
NEUTROPHILS NFR BLD AUTO: 77 % — SIGNIFICANT CHANGE UP (ref 43–77)
NITRITE UR-MCNC: NEGATIVE — SIGNIFICANT CHANGE UP
NRBC # BLD: SIGNIFICANT CHANGE UP /100 WBCS (ref 0–0)
PH UR: 7 — SIGNIFICANT CHANGE UP (ref 5–8)
PLATELET # BLD AUTO: 197 K/UL — SIGNIFICANT CHANGE UP (ref 150–400)
POTASSIUM SERPL-MCNC: 3.6 MMOL/L — SIGNIFICANT CHANGE UP (ref 3.5–5.3)
POTASSIUM SERPL-SCNC: 3.6 MMOL/L — SIGNIFICANT CHANGE UP (ref 3.5–5.3)
PROT ?TM UR-MCNC: 12 MG/DL — SIGNIFICANT CHANGE UP (ref 0–12)
PROT SERPL-MCNC: 7.2 GM/DL — SIGNIFICANT CHANGE UP (ref 6–8.3)
PROT UR-MCNC: NEGATIVE MG/DL — SIGNIFICANT CHANGE UP
PROT/CREAT UR-RTO: 0.1 RATIO — SIGNIFICANT CHANGE UP (ref 0–0.2)
PROTHROM AB SERPL-ACNC: 11.4 SEC — SIGNIFICANT CHANGE UP (ref 10.6–13.6)
RBC # BLD: 3.1 M/UL — LOW (ref 3.8–5.2)
RBC # FLD: 12.9 % — SIGNIFICANT CHANGE UP (ref 10.3–14.5)
SARS-COV-2 RNA SPEC QL NAA+PROBE: SIGNIFICANT CHANGE UP
SODIUM SERPL-SCNC: 139 MMOL/L — SIGNIFICANT CHANGE UP (ref 135–145)
SP GR SPEC: 1.01 — SIGNIFICANT CHANGE UP (ref 1.01–1.02)
TROPONIN I SERPL-MCNC: <0.015 NG/ML — SIGNIFICANT CHANGE UP (ref 0.01–0.04)
URATE SERPL-MCNC: 2.7 MG/DL — SIGNIFICANT CHANGE UP (ref 2.5–7)
UROBILINOGEN FLD QL: NEGATIVE MG/DL — SIGNIFICANT CHANGE UP
WBC # BLD: 8.84 K/UL — SIGNIFICANT CHANGE UP (ref 3.8–10.5)
WBC # FLD AUTO: 8.84 K/UL — SIGNIFICANT CHANGE UP (ref 3.8–10.5)

## 2021-08-08 PROCEDURE — 59025 FETAL NON-STRESS TEST: CPT

## 2021-08-08 PROCEDURE — 80053 COMPREHEN METABOLIC PANEL: CPT

## 2021-08-08 PROCEDURE — 85730 THROMBOPLASTIN TIME PARTIAL: CPT

## 2021-08-08 PROCEDURE — 71045 X-RAY EXAM CHEST 1 VIEW: CPT

## 2021-08-08 PROCEDURE — 36415 COLL VENOUS BLD VENIPUNCTURE: CPT

## 2021-08-08 PROCEDURE — 85610 PROTHROMBIN TIME: CPT

## 2021-08-08 PROCEDURE — 71045 X-RAY EXAM CHEST 1 VIEW: CPT | Mod: 26

## 2021-08-08 PROCEDURE — 84484 ASSAY OF TROPONIN QUANT: CPT

## 2021-08-08 PROCEDURE — 84550 ASSAY OF BLOOD/URIC ACID: CPT

## 2021-08-08 PROCEDURE — 85025 COMPLETE CBC W/AUTO DIFF WBC: CPT

## 2021-08-08 PROCEDURE — 93010 ELECTROCARDIOGRAM REPORT: CPT

## 2021-08-08 PROCEDURE — 83615 LACTATE (LD) (LDH) ENZYME: CPT

## 2021-08-08 PROCEDURE — 82570 ASSAY OF URINE CREATININE: CPT

## 2021-08-08 PROCEDURE — 99285 EMERGENCY DEPT VISIT HI MDM: CPT

## 2021-08-08 PROCEDURE — 99214 OFFICE O/P EST MOD 30 MIN: CPT

## 2021-08-08 PROCEDURE — 81001 URINALYSIS AUTO W/SCOPE: CPT

## 2021-08-08 PROCEDURE — 93005 ELECTROCARDIOGRAM TRACING: CPT

## 2021-08-08 PROCEDURE — 87635 SARS-COV-2 COVID-19 AMP PRB: CPT

## 2021-08-08 PROCEDURE — G0463: CPT

## 2021-08-08 PROCEDURE — 84156 ASSAY OF PROTEIN URINE: CPT

## 2021-08-08 PROCEDURE — 99283 EMERGENCY DEPT VISIT LOW MDM: CPT | Mod: 25

## 2021-08-08 NOTE — ED STATDOCS - NSFOLLOWUPINSTRUCTIONS_ED_ALL_ED_FT
Please follow up with your Primary MD in 1-2 days. Return to ED immediately for any new or concerning symptoms or worsening symptoms    Warning Signs During Pregnancy    A pregnancy lasts about 40 weeks, starting from the first day of your last period until the baby is born. Pregnancy is divided into three phases called trimesters.  •The first trimester refers to week 1 through week 13 of pregnancy.      •The second trimester is the start of week 14 through the end of week 27.      •The third trimester is the start of week 28 until you deliver your baby.      During each trimester of pregnancy, certain signs and symptoms may indicate a problem. Talk with your health care provider about your current health and any medical conditions you have. Make sure you know the symptoms that you should watch for and report.      How does this affect me?     Warning signs in the first trimester   While some changes during the first trimester may be uncomfortable, most do not represent a serious problem. Let your health care provider know if you have any of the following warning signs in the first trimester:  •You cannot eat or drink without vomiting, and this lasts for longer than a day.      •You have vaginal bleeding or spotting along with menstrual-like cramping.      •You have diarrhea for longer than a day.    •You have a fever or other signs of infection, such as:  •Pain or burning when you urinate.      •Foul smelling or thick or yellowish vaginal discharge.        Warning signs in the second trimester  As your baby grows and changes during the second trimester, there are additional signs and symptoms that may indicate a problem. These include:  •Signs and symptoms of infection, including a fever.      •Signs or symptoms of a miscarriage or  labor, such as regular contractions, menstrual-like cramping, or lower abdominal pain.      •Bloody or watery vaginal discharge or obvious vaginal bleeding.      •Feeling like your heart is pounding.      •Having trouble breathing.      •Nausea, vomiting, or diarrhea that lasts for longer than a day.      •Craving non-food items, such as kristyn, chalk, or dirt. This may be a sign of a very treatable medical condition called pica.    Later in your second trimester, watch for signs and symptoms of a serious medical condition called preeclampsia.These include:  •Changes in your vision.      •A severe headache that does not go away.      •Nausea and vomiting.      It is also important to notice if your baby stops moving or moves less than usual during this time.    Warning signs in the third trimester  As you approach the third trimester, your baby is growing and your body is preparing for the birth of your baby. In your third trimester, be sure to let your health care provider know if:  •You have signs and symptoms of infection, including a fever.      •You have vaginal bleeding.      •You notice that your baby is moving less than usual or is not moving.      •You have nausea, vomiting, or diarrhea that lasts for longer than a day.      •You have a severe headache that does not go away.      •You have vision changes, including seeing spots or having blurry or double vision.      •You have increased swelling in your hands or face.        How does this affect my baby?  Throughout your pregnancy, always report any of the warning signs of a problem to your health care provider. This can help prevent complications that may affect your baby, including:  •Increased risk for premature birth.      •Infection that may be transmitted to your baby.      •Increased risk for stillbirth.        Contact a health care provider if:    •You have any of the warning signs of a problem for the current trimester of your pregnancy.    •Any of the following apply to you during any trimester of pregnancy:  •You have strong emotions, such as sadness or anxiety, that interfere with work or personal relationships.      •You feel unsafe in your home and need help finding a safe place to live.      •You are using tobacco products, alcohol, or drugs and you need help to stop.          Get help right away if:  You have signs or symptoms of labor before 37 weeks of pregnancy. These include:  •Contractions that are 5 minutes or less apart, or that increase in frequency, intensity, or length.      •Sudden, sharp abdominal pain or low back pain.      •Uncontrolled gush or trickle of fluid from your vagina.        Summary    •A pregnancy lasts about 40 weeks, starting from the first day of your last period until the baby is born. Pregnancy is divided into three phases called trimesters. Each trimester has warning signs to watch for.      •Always report any warning signs to your health care provider in order to prevent complications that may affect both you and your baby.      •Talk with your health care provider about your current health and any medical conditions you have. Make sure you know the symptoms that you should watch for and report.      This information is not intended to replace advice given to you by your health care provider. Make sure you discuss any questions you have with your health care provider.                                                                  Shortness of Breath, Adult      Shortness of breath is when a person has trouble breathing enough air or when a person feels like she or he is having trouble breathing in enough air. Shortness of breath could be a sign of a medical problem.      Follow these instructions at home:     •Pay attention to any changes in your symptoms.      • Do not use any products that contain nicotine or tobacco, such as cigarettes, e-cigarettes, and chewing tobacco.       • Do not smoke. Smoking is a common cause of shortness of breath. If you need help quitting, ask your health care provider.    •Avoid things that can irritate your airways, such as:  •Mold.      •Dust.      •Air pollution.      •Chemical fumes.      •Things that can cause allergy symptoms (allergens), if you have allergies.        •Keep your living space clean and free of mold and dust.      •Rest as needed. Slowly return to your usual activities.      •Take over-the-counter and prescription medicines only as told by your health care provider. This includes oxygen therapy and inhaled medicines.      •Keep all follow-up visits as told by your health care provider. This is important.        Contact a health care provider if:    •Your condition does not improve as soon as expected.      •You have a hard time doing your normal activities, even after you rest.      •You have new symptoms.        Get help right away if:    •Your shortness of breath gets worse.      •You have shortness of breath when you are resting.      •You feel light-headed or you faint.      •You have a cough that is not controlled with medicines.      •You cough up blood.      •You have pain with breathing.      •You have pain in your chest, arms, shoulders, or abdomen.      •You have a fever.      •You cannot walk up stairs or exercise the way that you normally do.      These symptoms may represent a serious problem that is an emergency. Do not wait to see if the symptoms will go away. Get medical help right away. Call your local emergency services (911 in the [place] Do not drive yourself to the hospital.       Summary    •Shortness of breath is when a person has trouble breathing enough air. It can be a sign of a medical problem.      •Avoid things that irritate your lungs, such as smoking, pollution, mold, and dust.      •Pay attention to changes in your symptoms and contact your health care provider if you have a hard time completing daily activities because of shortness of breath.      This information is not intended to replace advice given to you by your health care provider. Make sure you discuss any questions you have with your health care provider.

## 2021-08-08 NOTE — ED ADULT TRIAGE NOTE - CHIEF COMPLAINT QUOTE
Patient 27 weeks pregnant was seen in L&D today for pre-eclampsia work-up and was ruled out.  L&D amy labs, COVID swabbed, UA.  Sent to ED by L&D for further workup of the SOB and chest tightness.

## 2021-08-08 NOTE — ED STATDOCS - PROGRESS NOTE DETAILS
d/w gyn resident. pt was ruled out for preeclampsia. BPs while on L&D were wnl. Pt is anemic and is on oral Iron. Sent to ED for  further work up of cp/sob. MD DEEPTHI Results reviewed and discussed with pt. Workup unremarkable, vitals normal, pt has fu with her ob on wednesday.  Discussed importance of close FU with PMD. Pt asked to return to ED immediately for any new or concerning sx or worsening. Pt acknowledges and understands plan -Heron Cummins PA-C 30 y/o F presents with SOB. Pt is  currently 27 weeks pregnant sent to ED for eval of SOB. Pt reports intermittent episodes of SOB and dizziness. States her Bp was elevated this morning. Sx tend to happen to her during pregnancy. Denies fever/chills, CP, abd pain, urinary sx, vaginal bleedign/dc, leg swelling or other complaints at this time. -Heron Cummins PA-C

## 2021-08-08 NOTE — ED STATDOCS - OBJECTIVE STATEMENT
32 y/o female, 27 weeks pregnant presents from L&D s/p evaluation for preeclampsia for episodes of increased HR, SOB, dizziness and elevated blood pressure. Pt states she had similar sx during last pregnancy, not on medications during first pregnancy. Pt saw cardiologist prior to pregnancy and was noncompliant with HTN medications. Not vaccinated against COVID. . Cardiologist: Dr. Ramey. 30 y/o female,  , 27 weeks pregnant presents from L&D s/p evaluation for preeclampsia for episodes of increased HR, SOB, dizziness and elevated blood pressure. Pt states she had similar sx during last pregnancy, not on medications during first pregnancy. Pt saw cardiologist prior to pregnancy and was noncompliant with HTN medications. Not vaccinated against COVID. . Cardiologist: Dr. Ramey.

## 2021-08-08 NOTE — ED STATDOCS - PATIENT PORTAL LINK FT
You can access the FollowMyHealth Patient Portal offered by [facility] by registering at the following website: [email or web address]. By joining [facility]’s FollowMyHealth portal, you will also be able to view your health information using other applications (apps) compatible with our system.

## 2021-08-09 LAB — PROT ?TM UR-MCNC: 95 MG/DL — HIGH (ref 0–12)

## 2021-08-11 ENCOUNTER — OUTPATIENT (OUTPATIENT)
Dept: INPATIENT UNIT | Facility: HOSPITAL | Age: 31
LOS: 1 days | Discharge: HOME | End: 2021-08-11
Payer: MEDICAID

## 2021-08-11 VITALS — HEART RATE: 101 BPM | DIASTOLIC BLOOD PRESSURE: 80 MMHG | SYSTOLIC BLOOD PRESSURE: 128 MMHG

## 2021-08-11 DIAGNOSIS — O26.899 OTHER SPECIFIED PREGNANCY RELATED CONDITIONS, UNSPECIFIED TRIMESTER: ICD-10-CM

## 2021-08-11 DIAGNOSIS — Z3A.00 WEEKS OF GESTATION OF PREGNANCY NOT SPECIFIED: ICD-10-CM

## 2021-08-11 LAB
ALBUMIN SERPL ELPH-MCNC: 3.7 G/DL — SIGNIFICANT CHANGE UP (ref 3.3–5)
ALP SERPL-CCNC: 91 U/L — SIGNIFICANT CHANGE UP (ref 40–120)
ALT FLD-CCNC: 7 U/L — SIGNIFICANT CHANGE UP (ref 4–33)
ANION GAP SERPL CALC-SCNC: 15 MMOL/L — HIGH (ref 7–14)
APPEARANCE UR: CLEAR — SIGNIFICANT CHANGE UP
APTT BLD: 25.2 SEC — LOW (ref 27–36.3)
AST SERPL-CCNC: 12 U/L — SIGNIFICANT CHANGE UP (ref 4–32)
BASOPHILS # BLD AUTO: 0.04 K/UL — SIGNIFICANT CHANGE UP (ref 0–0.2)
BASOPHILS NFR BLD AUTO: 0.4 % — SIGNIFICANT CHANGE UP (ref 0–2)
BILIRUB SERPL-MCNC: <0.2 MG/DL — SIGNIFICANT CHANGE UP (ref 0.2–1.2)
BILIRUB UR-MCNC: NEGATIVE — SIGNIFICANT CHANGE UP
BUN SERPL-MCNC: 7 MG/DL — SIGNIFICANT CHANGE UP (ref 7–23)
CALCIUM SERPL-MCNC: 9.1 MG/DL — SIGNIFICANT CHANGE UP (ref 8.4–10.5)
CHLORIDE SERPL-SCNC: 102 MMOL/L — SIGNIFICANT CHANGE UP (ref 98–107)
CO2 SERPL-SCNC: 19 MMOL/L — LOW (ref 22–31)
COLOR SPEC: SIGNIFICANT CHANGE UP
CREAT ?TM UR-MCNC: 72 MG/DL — SIGNIFICANT CHANGE UP
CREAT SERPL-MCNC: 0.52 MG/DL — SIGNIFICANT CHANGE UP (ref 0.5–1.3)
DIFF PNL FLD: NEGATIVE — SIGNIFICANT CHANGE UP
EOSINOPHIL # BLD AUTO: 0 K/UL — SIGNIFICANT CHANGE UP (ref 0–0.5)
EOSINOPHIL NFR BLD AUTO: 0 % — SIGNIFICANT CHANGE UP (ref 0–6)
FIBRINOGEN PPP-MCNC: 615 MG/DL — HIGH (ref 290–520)
GLUCOSE SERPL-MCNC: 92 MG/DL — SIGNIFICANT CHANGE UP (ref 70–99)
GLUCOSE UR QL: NEGATIVE — SIGNIFICANT CHANGE UP
HCT VFR BLD CALC: 29.3 % — LOW (ref 34.5–45)
HGB BLD-MCNC: 9.7 G/DL — LOW (ref 11.5–15.5)
IANC: 6.69 K/UL — SIGNIFICANT CHANGE UP (ref 1.5–8.5)
IMM GRANULOCYTES NFR BLD AUTO: 3.1 % — HIGH (ref 0–1.5)
INR BLD: 1.02 RATIO — SIGNIFICANT CHANGE UP (ref 0.88–1.16)
KETONES UR-MCNC: ABNORMAL
LDH SERPL L TO P-CCNC: 148 U/L — SIGNIFICANT CHANGE UP (ref 135–225)
LEUKOCYTE ESTERASE UR-ACNC: NEGATIVE — SIGNIFICANT CHANGE UP
LYMPHOCYTES # BLD AUTO: 1.56 K/UL — SIGNIFICANT CHANGE UP (ref 1–3.3)
LYMPHOCYTES # BLD AUTO: 17.1 % — SIGNIFICANT CHANGE UP (ref 13–44)
MCHC RBC-ENTMCNC: 32.2 PG — SIGNIFICANT CHANGE UP (ref 27–34)
MCHC RBC-ENTMCNC: 33.1 GM/DL — SIGNIFICANT CHANGE UP (ref 32–36)
MCV RBC AUTO: 97.3 FL — SIGNIFICANT CHANGE UP (ref 80–100)
MONOCYTES # BLD AUTO: 0.57 K/UL — SIGNIFICANT CHANGE UP (ref 0–0.9)
MONOCYTES NFR BLD AUTO: 6.2 % — SIGNIFICANT CHANGE UP (ref 2–14)
NEUTROPHILS # BLD AUTO: 6.69 K/UL — SIGNIFICANT CHANGE UP (ref 1.8–7.4)
NEUTROPHILS NFR BLD AUTO: 73.2 % — SIGNIFICANT CHANGE UP (ref 43–77)
NITRITE UR-MCNC: NEGATIVE — SIGNIFICANT CHANGE UP
NRBC # BLD: 0 /100 WBCS — SIGNIFICANT CHANGE UP
NRBC # FLD: 0 K/UL — SIGNIFICANT CHANGE UP
PH UR: 6.5 — SIGNIFICANT CHANGE UP (ref 5–8)
PLATELET # BLD AUTO: 186 K/UL — SIGNIFICANT CHANGE UP (ref 150–400)
POTASSIUM SERPL-MCNC: 3.4 MMOL/L — LOW (ref 3.5–5.3)
POTASSIUM SERPL-SCNC: 3.4 MMOL/L — LOW (ref 3.5–5.3)
PROT ?TM UR-MCNC: 7 MG/DL — SIGNIFICANT CHANGE UP
PROT ?TM UR-MCNC: 7 MG/DL — SIGNIFICANT CHANGE UP
PROT SERPL-MCNC: 6.8 G/DL — SIGNIFICANT CHANGE UP (ref 6–8.3)
PROT UR-MCNC: NEGATIVE — SIGNIFICANT CHANGE UP
PROT/CREAT UR-RTO: 0.1 RATIO — SIGNIFICANT CHANGE UP (ref 0–0.2)
PROTHROM AB SERPL-ACNC: 11.7 SEC — SIGNIFICANT CHANGE UP (ref 10.6–13.6)
RBC # BLD: 3.01 M/UL — LOW (ref 3.8–5.2)
RBC # FLD: 13.3 % — SIGNIFICANT CHANGE UP (ref 10.3–14.5)
SODIUM SERPL-SCNC: 136 MMOL/L — SIGNIFICANT CHANGE UP (ref 135–145)
SP GR SPEC: 1.02 — SIGNIFICANT CHANGE UP (ref 1.01–1.02)
URATE SERPL-MCNC: 2.9 MG/DL — SIGNIFICANT CHANGE UP (ref 2.5–7)
UROBILINOGEN FLD QL: SIGNIFICANT CHANGE UP
WBC # BLD: 9.14 K/UL — SIGNIFICANT CHANGE UP (ref 3.8–10.5)
WBC # FLD AUTO: 9.14 K/UL — SIGNIFICANT CHANGE UP (ref 3.8–10.5)

## 2021-08-11 PROCEDURE — 76818 FETAL BIOPHYS PROFILE W/NST: CPT | Mod: 26

## 2021-08-11 NOTE — OB RN TRIAGE NOTE - CHIEF COMPLAINT QUOTE
Send from the doctor's office for the elevated blood pressures 140/90 , 126/89, with  no headache, blurred vision or epigastric pain

## 2021-08-11 NOTE — OB RN TRIAGE NOTE - NSICDXFAMHXNEG_GEN_ALL
asthma/atrial fibrillation/brain aneurysm/cancer/congestive heart failure/COPD/coronary disease/diabetes/dementia/heart disease/hypertension/kidney disease/stroke/thyroid disease/VTE

## 2021-08-11 NOTE — OB RN TRIAGE NOTE - NS_OBGYNHISTORY_OBGYN_ALL_OB_FT
TOP X2 with no D&C  Miscarriage X 1 with no D&C  PCOS TOP X2 with no D&C  Miscarriage X 1 with no D&C  PCOS   FT  7#

## 2021-08-11 NOTE — OB PROVIDER TRIAGE NOTE - NSHPPHYSICALEXAM_GEN_ALL_CORE
ICU Vital Signs Last 24 Hrs  T(C): 36.8 (11 Aug 2021 22:33), Max: 36.8 (11 Aug 2021 22:33)  T(F): 98.2 (11 Aug 2021 22:33), Max: 98.2 (11 Aug 2021 22:33)  HR: 80 (11 Aug 2021 23:54) (75 - 101)  BP: 116/68 (11 Aug 2021 23:54) (113/62 - 128/80)  BP(mean): --  ABP: --  ABP(mean): --  RR: 17 (11 Aug 2021 22:33) (17 - 17)  SpO2: --    Abdomen soft nontender  TAS: Cephalic presentation, anterior placenta, MVP: 5.49, BPP: 8/8  FHR: 135bpm, moderate variability, accels, no decels   No contractions on TOCO ICU Vital Signs Last 24 Hrs  T(C): 36.8 (11 Aug 2021 22:33), Max: 36.8 (11 Aug 2021 22:33)  T(F): 98.2 (11 Aug 2021 22:33), Max: 98.2 (11 Aug 2021 22:33)  HR: 80 (11 Aug 2021 23:54) (75 - 101)  BP: 116/68 (11 Aug 2021 23:54) (113/62 - 128/80)  BP(mean): --  ABP: --  ABP(mean): --  RR: 17 (11 Aug 2021 22:33) (17 - 17)  SpO2: --    Abdomen soft nontender  TAS: Cephalic presentation, anterior placenta, MVP: 5.49, EFW: 1085gm, BPP: 8/8  FHR: 135bpm, moderate variability, accels, no decels   No contractions on TOCO

## 2021-08-11 NOTE — OB PROVIDER TRIAGE NOTE - NSOBPROVIDERNOTE_OBGYN_ALL_OB_FT
No evidence of preeclampsia at this time. Discussed findings with Dr. Youngblood. Pt. d/c'd home. Pt. to follow up with OB next week and to get blood pressure machine with appropriate size cuff. Pt. instructed to return to triage with blood pressures greater than 140/90, headache unresolved with Tylenol, visual changes, and epigastric/RUQ pain.

## 2021-08-11 NOTE — OB PROVIDER TRIAGE NOTE - ADDITIONAL INSTRUCTIONS
Pt. d/c'd home. Pt. to follow up with OB next week. Pt. instructed to return to triage with blood pressures greater than 140/90, headache unresolved with Tylenol, visual changes, and epigastric/RUQ pain.

## 2021-08-11 NOTE — OB PROVIDER TRIAGE NOTE - NSHPLABSRESULTS_GEN_ALL_CORE
9.7    9.14  )-----------( 186      ( 11 Aug 2021 22:37 )             29.3     PT/INR - ( 11 Aug 2021 22:37 )   PT: 11.7 sec;   INR: 1.02 ratio    PTT - ( 11 Aug 2021 22:37 )  PTT:25.2 sec  Fibrinogen:    136  |  102  |  7   ----------------------------<  92  3.4<L>   |  19<L>  |  0.52    Ca    9.1      11 Aug 2021 22:37    TPro  6.8  /  Alb  3.7  /  TBili  <0.2  /  DBili  x   /  AST  12  /  ALT  7   /  AlkPhos  91  08-11    Urinalysis Basic - ( 11 Aug 2021 22:37 )    Color: Light Yellow / Appearance: Clear / S.016 / pH: x  Gluc: x / Ketone: Trace  / Bili: Negative / Urobili: <2 mg/dL   Blood: x / Protein: Negative / Nitrite: Negative   Leuk Esterase: Negative / RBC: x / WBC x   Sq Epi: x / Non Sq Epi: x / Bacteria: x

## 2021-08-11 NOTE — OB PROVIDER TRIAGE NOTE - HISTORY OF PRESENT ILLNESS
Pt. is a 32y/o  EGA 27.4wks was sent in from her OB's office for elevated blood pressures (140/90 and 126/89). Pt. denies any headache, visual changes, epigastric/RUQ pain. Pt. states her blood pressures at home are 130-140/80-90s.    AP: Denies  Medical Hx:   Heart palpitations- Cardiologist - Dr. Paco Ramey   Surgical Hx: Denies  OBGYN Hx:    2018 7#10   Pt. is a 30y/o  EGA 27.4wks was sent in from her OB's office for elevated blood pressures (140/90 and 126/89). Pt. denies any headache, visual changes, epigastric/RUQ pain. Pt. states her blood pressures at home are 130-140/80-90s.    AP: Denies  Medical Hx:   Heart palpitations- Cardiologist - Dr. Paco Ramey   Surgical Hx: Denies  OBGYN Hx:    2018 7#10  SABx1 2019  TOPx2 2020

## 2021-08-12 VITALS — DIASTOLIC BLOOD PRESSURE: 68 MMHG | HEART RATE: 75 BPM | SYSTOLIC BLOOD PRESSURE: 117 MMHG

## 2021-08-16 DIAGNOSIS — Z3A.00 WEEKS OF GESTATION OF PREGNANCY NOT SPECIFIED: ICD-10-CM

## 2021-08-16 DIAGNOSIS — O26.899 OTHER SPECIFIED PREGNANCY RELATED CONDITIONS, UNSPECIFIED TRIMESTER: ICD-10-CM

## 2021-08-19 ENCOUNTER — ASOB RESULT (OUTPATIENT)
Age: 31
End: 2021-08-19

## 2021-08-19 ENCOUNTER — APPOINTMENT (OUTPATIENT)
Dept: ANTEPARTUM | Facility: CLINIC | Age: 31
End: 2021-08-19
Payer: MEDICAID

## 2021-08-19 PROCEDURE — 76816 OB US FOLLOW-UP PER FETUS: CPT

## 2021-09-03 ENCOUNTER — ASOB RESULT (OUTPATIENT)
Age: 31
End: 2021-09-03

## 2021-09-03 ENCOUNTER — APPOINTMENT (OUTPATIENT)
Dept: ANTEPARTUM | Facility: CLINIC | Age: 31
End: 2021-09-03
Payer: MEDICAID

## 2021-09-03 ENCOUNTER — APPOINTMENT (OUTPATIENT)
Dept: ANTEPARTUM | Facility: HOSPITAL | Age: 31
End: 2021-09-03

## 2021-09-03 PROCEDURE — 76816 OB US FOLLOW-UP PER FETUS: CPT

## 2021-09-03 PROCEDURE — 76819 FETAL BIOPHYS PROFIL W/O NST: CPT

## 2021-09-07 ENCOUNTER — APPOINTMENT (OUTPATIENT)
Dept: ANTEPARTUM | Facility: CLINIC | Age: 31
End: 2021-09-07
Payer: MEDICAID

## 2021-09-07 ENCOUNTER — ASOB RESULT (OUTPATIENT)
Age: 31
End: 2021-09-07

## 2021-09-07 PROCEDURE — 99203 OFFICE O/P NEW LOW 30 MIN: CPT | Mod: TH,95

## 2021-09-16 ENCOUNTER — OUTPATIENT (OUTPATIENT)
Dept: INPATIENT UNIT | Facility: HOSPITAL | Age: 31
LOS: 1 days | Discharge: HOME | End: 2021-09-16

## 2021-09-16 VITALS
HEART RATE: 105 BPM | DIASTOLIC BLOOD PRESSURE: 68 MMHG | TEMPERATURE: 98 F | SYSTOLIC BLOOD PRESSURE: 114 MMHG | RESPIRATION RATE: 16 BRPM

## 2021-09-16 VITALS — SYSTOLIC BLOOD PRESSURE: 110 MMHG | HEART RATE: 77 BPM

## 2021-09-16 DIAGNOSIS — Z3A.00 WEEKS OF GESTATION OF PREGNANCY NOT SPECIFIED: ICD-10-CM

## 2021-09-16 DIAGNOSIS — O26.899 OTHER SPECIFIED PREGNANCY RELATED CONDITIONS, UNSPECIFIED TRIMESTER: ICD-10-CM

## 2021-09-16 LAB
ALBUMIN SERPL ELPH-MCNC: 3.8 G/DL — SIGNIFICANT CHANGE UP (ref 3.3–5)
ALP SERPL-CCNC: 120 U/L — SIGNIFICANT CHANGE UP (ref 40–120)
ALT FLD-CCNC: 6 U/L — SIGNIFICANT CHANGE UP (ref 4–33)
ANION GAP SERPL CALC-SCNC: 11 MMOL/L — SIGNIFICANT CHANGE UP (ref 7–14)
APPEARANCE UR: ABNORMAL
APTT BLD: 25.1 SEC — LOW (ref 27–36.3)
AST SERPL-CCNC: 12 U/L — SIGNIFICANT CHANGE UP (ref 4–32)
BACTERIA # UR AUTO: ABNORMAL
BASOPHILS # BLD AUTO: 0.04 K/UL — SIGNIFICANT CHANGE UP (ref 0–0.2)
BASOPHILS NFR BLD AUTO: 0.5 % — SIGNIFICANT CHANGE UP (ref 0–2)
BILIRUB SERPL-MCNC: <0.2 MG/DL — SIGNIFICANT CHANGE UP (ref 0.2–1.2)
BILIRUB UR-MCNC: NEGATIVE — SIGNIFICANT CHANGE UP
BUN SERPL-MCNC: 6 MG/DL — LOW (ref 7–23)
CALCIUM SERPL-MCNC: 9.2 MG/DL — SIGNIFICANT CHANGE UP (ref 8.4–10.5)
CHLORIDE SERPL-SCNC: 105 MMOL/L — SIGNIFICANT CHANGE UP (ref 98–107)
CO2 SERPL-SCNC: 22 MMOL/L — SIGNIFICANT CHANGE UP (ref 22–31)
COLOR SPEC: YELLOW — SIGNIFICANT CHANGE UP
CREAT ?TM UR-MCNC: 81 MG/DL — SIGNIFICANT CHANGE UP
CREAT SERPL-MCNC: 0.46 MG/DL — LOW (ref 0.5–1.3)
DIFF PNL FLD: NEGATIVE — SIGNIFICANT CHANGE UP
EOSINOPHIL # BLD AUTO: 0.01 K/UL — SIGNIFICANT CHANGE UP (ref 0–0.5)
EOSINOPHIL NFR BLD AUTO: 0.1 % — SIGNIFICANT CHANGE UP (ref 0–6)
EPI CELLS # UR: 4 /HPF — SIGNIFICANT CHANGE UP (ref 0–5)
FIBRINOGEN PPP-MCNC: 712 MG/DL — HIGH (ref 290–520)
GLUCOSE SERPL-MCNC: 95 MG/DL — SIGNIFICANT CHANGE UP (ref 70–99)
GLUCOSE UR QL: NEGATIVE — SIGNIFICANT CHANGE UP
HCT VFR BLD CALC: 32.2 % — LOW (ref 34.5–45)
HGB BLD-MCNC: 10.7 G/DL — LOW (ref 11.5–15.5)
HYALINE CASTS # UR AUTO: 0 /LPF — SIGNIFICANT CHANGE UP (ref 0–7)
IANC: 5.4 K/UL — SIGNIFICANT CHANGE UP (ref 1.5–8.5)
IMM GRANULOCYTES NFR BLD AUTO: 2.6 % — HIGH (ref 0–1.5)
INR BLD: 0.98 RATIO — SIGNIFICANT CHANGE UP (ref 0.88–1.16)
KETONES UR-MCNC: NEGATIVE — SIGNIFICANT CHANGE UP
LDH SERPL L TO P-CCNC: 140 U/L — SIGNIFICANT CHANGE UP (ref 135–225)
LEUKOCYTE ESTERASE UR-ACNC: NEGATIVE — SIGNIFICANT CHANGE UP
LYMPHOCYTES # BLD AUTO: 1.29 K/UL — SIGNIFICANT CHANGE UP (ref 1–3.3)
LYMPHOCYTES # BLD AUTO: 17.1 % — SIGNIFICANT CHANGE UP (ref 13–44)
MCHC RBC-ENTMCNC: 32.4 PG — SIGNIFICANT CHANGE UP (ref 27–34)
MCHC RBC-ENTMCNC: 33.2 GM/DL — SIGNIFICANT CHANGE UP (ref 32–36)
MCV RBC AUTO: 97.6 FL — SIGNIFICANT CHANGE UP (ref 80–100)
MONOCYTES # BLD AUTO: 0.62 K/UL — SIGNIFICANT CHANGE UP (ref 0–0.9)
MONOCYTES NFR BLD AUTO: 8.2 % — SIGNIFICANT CHANGE UP (ref 2–14)
NEUTROPHILS # BLD AUTO: 5.4 K/UL — SIGNIFICANT CHANGE UP (ref 1.8–7.4)
NEUTROPHILS NFR BLD AUTO: 71.5 % — SIGNIFICANT CHANGE UP (ref 43–77)
NITRITE UR-MCNC: NEGATIVE — SIGNIFICANT CHANGE UP
NRBC # BLD: 0 /100 WBCS — SIGNIFICANT CHANGE UP
NRBC # FLD: 0 K/UL — SIGNIFICANT CHANGE UP
PH UR: 7.5 — SIGNIFICANT CHANGE UP (ref 5–8)
PLATELET # BLD AUTO: 186 K/UL — SIGNIFICANT CHANGE UP (ref 150–400)
POTASSIUM SERPL-MCNC: 4 MMOL/L — SIGNIFICANT CHANGE UP (ref 3.5–5.3)
POTASSIUM SERPL-SCNC: 4 MMOL/L — SIGNIFICANT CHANGE UP (ref 3.5–5.3)
PROT ?TM UR-MCNC: 10 MG/DL — SIGNIFICANT CHANGE UP
PROT ?TM UR-MCNC: 10 MG/DL — SIGNIFICANT CHANGE UP
PROT SERPL-MCNC: 7.1 G/DL — SIGNIFICANT CHANGE UP (ref 6–8.3)
PROT UR-MCNC: ABNORMAL
PROT/CREAT UR-RTO: 0.1 RATIO — SIGNIFICANT CHANGE UP (ref 0–0.2)
PROTHROM AB SERPL-ACNC: 11.2 SEC — SIGNIFICANT CHANGE UP (ref 10.6–13.6)
RBC # BLD: 3.3 M/UL — LOW (ref 3.8–5.2)
RBC # FLD: 14.1 % — SIGNIFICANT CHANGE UP (ref 10.3–14.5)
RBC CASTS # UR COMP ASSIST: 1 /HPF — SIGNIFICANT CHANGE UP (ref 0–4)
SODIUM SERPL-SCNC: 138 MMOL/L — SIGNIFICANT CHANGE UP (ref 135–145)
SP GR SPEC: 1.02 — SIGNIFICANT CHANGE UP (ref 1–1.05)
URATE SERPL-MCNC: 3.3 MG/DL — SIGNIFICANT CHANGE UP (ref 2.5–7)
UROBILINOGEN FLD QL: SIGNIFICANT CHANGE UP
WBC # BLD: 7.56 K/UL — SIGNIFICANT CHANGE UP (ref 3.8–10.5)
WBC # FLD AUTO: 7.56 K/UL — SIGNIFICANT CHANGE UP (ref 3.8–10.5)
WBC UR QL: 9 /HPF — HIGH (ref 0–5)

## 2021-09-16 NOTE — OB RN TRIAGE NOTE - NS_TRIAGEADDITIONAL COMMENTS_OBGYN_ALL_OB_FT
5-7  206  hi pt volume B Arnulfo saez made aware 5-7  206  hi pt volume B Arnulfo saez made aware  1637 pt continues to wait, Charge nurse RAMBO Avilez, RN aware of pts wait time 5-7  206  hi pt volume B Arnulfo rn made aware  1637 pt continues to wait, Charge nurse RAMBO Avilez RN aware of pts wait time. INEZ Campos notified and aware of wait time

## 2021-09-16 NOTE — OB PROVIDER TRIAGE NOTE - HISTORY OF PRESENT ILLNESS
30 y/o  @ 32.5 wks gestation presents   with c/o " headache" on and off states had a headache 10/10 on pain scale  and did no take Tylenol and states went away on its own reports having visual disturbances on and off for weeks pt has been monitoring her BP @ Home over the past few weeks  126/101 and 156/90 and states for the most part my BP's are normal denies any right upper epigastric pain denies any uc's vb or lof reports +FM denies any n/v/d denies any fever or chills ap care comp by:   seen by cardiology @ [facility]   palpitations   headache   visual disturbances   scheduled echo 2021

## 2021-09-16 NOTE — OB PROVIDER TRIAGE NOTE - NSHPLABSRESULTS_GEN_ALL_CORE
PEC labs 10.7   7.56  )-----------( 186      ( 16 Sep 2021 19:34 )             32.2         138  |  105  |  6<L>  ----------------------------<  95  4.0   |  22  |  0.46<L>    Ca    9.2      16 Sep 2021 19:34    TPro  7.1  /  Alb  3.8  /  TBili  <0.2  /  DBili  x   /  AST  12  /  ALT  6   /  AlkPhos  120            Urinalysis Basic - ( 16 Sep 2021 19:34 )    Color: Yellow / Appearance: Slightly Turbid / S.016 / pH: x  Gluc: x / Ketone: Negative  / Bili: Negative / Urobili: <2 mg/dL   Blood: x / Protein: Trace / Nitrite: Negative   Leuk Esterase: Negative / RBC: 1 /HPF / WBC 9 /HPF   Sq Epi: x / Non Sq Epi: 4 /HPF / Bacteria: Moderate      PT/INR - ( 16 Sep 2021 19:34 )   PT: 11.2 sec;   INR: 0.98 ratio         PTT - ( 16 Sep 2021 19:34 )  PTT:25.1 sec    Vital Signs Last 24 Hrs  T(C): 36.4 (16 Sep 2021 13:59), Max: 36.4 (16 Sep 2021 13:59)  T(F): 97.5 (16 Sep 2021 13:59), Max: 97.5 (16 Sep 2021 13:59)  HR: 77 (16 Sep 2021 20:28) (73 - 107)  BP: 110/- (16 Sep 2021 20:28) (106/72 - 138/91)  BP(mean): --  RR: 16 (16 Sep 2021 13:59) (16 - 16)  SpO2: --

## 2021-09-16 NOTE — OB PROVIDER TRIAGE NOTE - NSOBPROVIDERNOTE_OBGYN_ALL_OB_FT
Stable for discharge  NO evidence of PEC  CAT 1 tracing  TOCO: ctx  f/u with OB Provider in AM to schedule next apt  PEC precautions given  Discussed with Dr. Clark

## 2021-09-16 NOTE — OB PROVIDER TRIAGE NOTE - NSHPPHYSICALEXAM_GEN_ALL_CORE
abdomen: soft, nt on palp  T(C): 36.4 (09-16-21 @ 13:59), Max: 36.4 (09-16-21 @ 13:59)  HR: 85 (09-16-21 @ 18:59) (85 - 107)  BP: 111/75 (09-16-21 @ 18:59) (110/69 - 138/91) semi fowlers position   RR: 16 (09-16-21 @ 13:59) (16 - 16)  SpO2: --  category 1 FHT  toco: irregular

## 2021-09-16 NOTE — OB RN TRIAGE NOTE - CHIEF COMPLAINT QUOTE
headaches elevated bp 24 hr urine to start today  palpitations holter monitoring in progress follows cardio;ogy

## 2021-09-30 ENCOUNTER — OUTPATIENT (OUTPATIENT)
Dept: OUTPATIENT SERVICES | Facility: HOSPITAL | Age: 31
LOS: 1 days | End: 2021-09-30

## 2021-09-30 ENCOUNTER — APPOINTMENT (OUTPATIENT)
Dept: ANTEPARTUM | Facility: CLINIC | Age: 31
End: 2021-09-30
Payer: MEDICAID

## 2021-09-30 ENCOUNTER — ASOB RESULT (OUTPATIENT)
Age: 31
End: 2021-09-30

## 2021-09-30 PROCEDURE — 76816 OB US FOLLOW-UP PER FETUS: CPT

## 2021-09-30 PROCEDURE — 76818 FETAL BIOPHYS PROFILE W/NST: CPT | Mod: 26

## 2021-10-07 ENCOUNTER — APPOINTMENT (OUTPATIENT)
Dept: ANTEPARTUM | Facility: HOSPITAL | Age: 31
End: 2021-10-07

## 2021-10-07 ENCOUNTER — APPOINTMENT (OUTPATIENT)
Dept: ANTEPARTUM | Facility: CLINIC | Age: 31
End: 2021-10-07

## 2021-10-14 ENCOUNTER — APPOINTMENT (OUTPATIENT)
Dept: ANTEPARTUM | Facility: CLINIC | Age: 31
End: 2021-10-14
Payer: MEDICAID

## 2021-10-14 ENCOUNTER — ASOB RESULT (OUTPATIENT)
Age: 31
End: 2021-10-14

## 2021-10-14 ENCOUNTER — APPOINTMENT (OUTPATIENT)
Dept: ANTEPARTUM | Facility: HOSPITAL | Age: 31
End: 2021-10-14

## 2021-10-14 PROCEDURE — 76819 FETAL BIOPHYS PROFIL W/O NST: CPT

## 2021-10-14 PROCEDURE — 76816 OB US FOLLOW-UP PER FETUS: CPT

## 2021-10-19 ENCOUNTER — APPOINTMENT (OUTPATIENT)
Dept: ANTEPARTUM | Facility: CLINIC | Age: 31
End: 2021-10-19

## 2021-10-20 ENCOUNTER — APPOINTMENT (OUTPATIENT)
Dept: ANTEPARTUM | Facility: CLINIC | Age: 31
End: 2021-10-20

## 2021-10-21 ENCOUNTER — INPATIENT (INPATIENT)
Facility: HOSPITAL | Age: 31
LOS: 2 days | Discharge: HOME | End: 2021-10-24
Attending: OBSTETRICS & GYNECOLOGY | Admitting: OBSTETRICS & GYNECOLOGY

## 2021-10-21 VITALS
RESPIRATION RATE: 18 BRPM | HEART RATE: 101 BPM | TEMPERATURE: 98 F | SYSTOLIC BLOOD PRESSURE: 128 MMHG | DIASTOLIC BLOOD PRESSURE: 80 MMHG

## 2021-10-21 DIAGNOSIS — Z98.890 OTHER SPECIFIED POSTPROCEDURAL STATES: Chronic | ICD-10-CM

## 2021-10-21 DIAGNOSIS — O26.899 OTHER SPECIFIED PREGNANCY RELATED CONDITIONS, UNSPECIFIED TRIMESTER: ICD-10-CM

## 2021-10-21 DIAGNOSIS — Z3A.00 WEEKS OF GESTATION OF PREGNANCY NOT SPECIFIED: ICD-10-CM

## 2021-10-21 LAB
ALBUMIN SERPL ELPH-MCNC: 3.7 G/DL — SIGNIFICANT CHANGE UP (ref 3.3–5)
ALP SERPL-CCNC: 142 U/L — HIGH (ref 40–120)
ALT FLD-CCNC: 9 U/L — SIGNIFICANT CHANGE UP (ref 4–33)
ANION GAP SERPL CALC-SCNC: 13 MMOL/L — SIGNIFICANT CHANGE UP (ref 7–14)
APPEARANCE UR: CLEAR — SIGNIFICANT CHANGE UP
APTT BLD: 27.7 SEC — SIGNIFICANT CHANGE UP (ref 27–36.3)
AST SERPL-CCNC: 15 U/L — SIGNIFICANT CHANGE UP (ref 4–32)
BASOPHILS # BLD AUTO: 0 K/UL — SIGNIFICANT CHANGE UP (ref 0–0.2)
BASOPHILS NFR BLD AUTO: 0 % — SIGNIFICANT CHANGE UP (ref 0–2)
BILIRUB SERPL-MCNC: <0.2 MG/DL — SIGNIFICANT CHANGE UP (ref 0.2–1.2)
BILIRUB UR-MCNC: NEGATIVE — SIGNIFICANT CHANGE UP
BUN SERPL-MCNC: 6 MG/DL — LOW (ref 7–23)
CALCIUM SERPL-MCNC: 9.2 MG/DL — SIGNIFICANT CHANGE UP (ref 8.4–10.5)
CHLORIDE SERPL-SCNC: 104 MMOL/L — SIGNIFICANT CHANGE UP (ref 98–107)
CO2 SERPL-SCNC: 19 MMOL/L — LOW (ref 22–31)
COLOR SPEC: SIGNIFICANT CHANGE UP
CREAT ?TM UR-MCNC: 46 MG/DL — SIGNIFICANT CHANGE UP
CREAT SERPL-MCNC: 0.5 MG/DL — SIGNIFICANT CHANGE UP (ref 0.5–1.3)
DIFF PNL FLD: NEGATIVE — SIGNIFICANT CHANGE UP
EOSINOPHIL # BLD AUTO: 0 K/UL — SIGNIFICANT CHANGE UP (ref 0–0.5)
EOSINOPHIL NFR BLD AUTO: 0 % — SIGNIFICANT CHANGE UP (ref 0–6)
FIBRINOGEN PPP-MCNC: 621 MG/DL — HIGH (ref 290–520)
GLUCOSE SERPL-MCNC: 180 MG/DL — HIGH (ref 70–99)
GLUCOSE UR QL: NEGATIVE — SIGNIFICANT CHANGE UP
HCT VFR BLD CALC: 33.7 % — LOW (ref 34.5–45)
HGB BLD-MCNC: 11.1 G/DL — LOW (ref 11.5–15.5)
IANC: 5.5 K/UL — SIGNIFICANT CHANGE UP (ref 1.5–8.5)
INR BLD: 0.96 RATIO — SIGNIFICANT CHANGE UP (ref 0.88–1.16)
KETONES UR-MCNC: NEGATIVE — SIGNIFICANT CHANGE UP
LDH SERPL L TO P-CCNC: 161 U/L — SIGNIFICANT CHANGE UP (ref 135–225)
LEUKOCYTE ESTERASE UR-ACNC: NEGATIVE — SIGNIFICANT CHANGE UP
LYMPHOCYTES # BLD AUTO: 0.94 K/UL — LOW (ref 1–3.3)
LYMPHOCYTES # BLD AUTO: 12.4 % — LOW (ref 13–44)
MANUAL SMEAR VERIFICATION: SIGNIFICANT CHANGE UP
MCHC RBC-ENTMCNC: 32.7 PG — SIGNIFICANT CHANGE UP (ref 27–34)
MCHC RBC-ENTMCNC: 32.9 GM/DL — SIGNIFICANT CHANGE UP (ref 32–36)
MCV RBC AUTO: 99.4 FL — SIGNIFICANT CHANGE UP (ref 80–100)
METAMYELOCYTES # FLD: 2.7 % — HIGH (ref 0–1)
MONOCYTES # BLD AUTO: 0.33 K/UL — SIGNIFICANT CHANGE UP (ref 0–0.9)
MONOCYTES NFR BLD AUTO: 4.4 % — SIGNIFICANT CHANGE UP (ref 2–14)
MYELOCYTES NFR BLD: 2.6 % — HIGH (ref 0–0)
NEUTROPHILS # BLD AUTO: 5.9 K/UL — SIGNIFICANT CHANGE UP (ref 1.8–7.4)
NEUTROPHILS NFR BLD AUTO: 77.9 % — HIGH (ref 43–77)
NITRITE UR-MCNC: NEGATIVE — SIGNIFICANT CHANGE UP
PH UR: 6.5 — SIGNIFICANT CHANGE UP (ref 5–8)
PLAT MORPH BLD: NORMAL — SIGNIFICANT CHANGE UP
PLATELET # BLD AUTO: 180 K/UL — SIGNIFICANT CHANGE UP (ref 150–400)
PLATELET COUNT - ESTIMATE: NORMAL — SIGNIFICANT CHANGE UP
POTASSIUM SERPL-MCNC: 3.8 MMOL/L — SIGNIFICANT CHANGE UP (ref 3.5–5.3)
POTASSIUM SERPL-SCNC: 3.8 MMOL/L — SIGNIFICANT CHANGE UP (ref 3.5–5.3)
PROT ?TM UR-MCNC: 7 MG/DL — SIGNIFICANT CHANGE UP
PROT ?TM UR-MCNC: 7 MG/DL — SIGNIFICANT CHANGE UP
PROT SERPL-MCNC: 7.1 G/DL — SIGNIFICANT CHANGE UP (ref 6–8.3)
PROT UR-MCNC: NEGATIVE — SIGNIFICANT CHANGE UP
PROT/CREAT UR-RTO: 0.2 RATIO — SIGNIFICANT CHANGE UP (ref 0–0.2)
PROTHROM AB SERPL-ACNC: 11 SEC — SIGNIFICANT CHANGE UP (ref 10.6–13.6)
RBC # BLD: 3.39 M/UL — LOW (ref 3.8–5.2)
RBC # FLD: 14.3 % — SIGNIFICANT CHANGE UP (ref 10.3–14.5)
RBC BLD AUTO: NORMAL — SIGNIFICANT CHANGE UP
SODIUM SERPL-SCNC: 136 MMOL/L — SIGNIFICANT CHANGE UP (ref 135–145)
SP GR SPEC: 1.01 — SIGNIFICANT CHANGE UP (ref 1–1.05)
URATE SERPL-MCNC: 4 MG/DL — SIGNIFICANT CHANGE UP (ref 2.5–7)
UROBILINOGEN FLD QL: SIGNIFICANT CHANGE UP
WBC # BLD: 7.57 K/UL — SIGNIFICANT CHANGE UP (ref 3.8–10.5)
WBC # FLD AUTO: 7.57 K/UL — SIGNIFICANT CHANGE UP (ref 3.8–10.5)

## 2021-10-21 RX ORDER — SODIUM CHLORIDE 9 MG/ML
1000 INJECTION, SOLUTION INTRAVENOUS
Refills: 0 | Status: DISCONTINUED | OUTPATIENT
Start: 2021-10-21 | End: 2021-10-22

## 2021-10-21 RX ORDER — LABETALOL HCL 100 MG
100 TABLET ORAL ONCE
Refills: 0 | Status: COMPLETED | OUTPATIENT
Start: 2021-10-21 | End: 2021-10-21

## 2021-10-21 RX ORDER — ACETAMINOPHEN 500 MG
1000 TABLET ORAL ONCE
Refills: 0 | Status: COMPLETED | OUTPATIENT
Start: 2021-10-21 | End: 2021-10-21

## 2021-10-21 RX ORDER — OXYTOCIN 10 UNIT/ML
333.33 VIAL (ML) INJECTION
Qty: 20 | Refills: 0 | Status: DISCONTINUED | OUTPATIENT
Start: 2021-10-21 | End: 2021-10-22

## 2021-10-21 RX ADMIN — Medication 1000 MILLIGRAM(S): at 22:40

## 2021-10-21 RX ADMIN — Medication 1000 MILLIGRAM(S): at 22:15

## 2021-10-21 RX ADMIN — Medication 100 MILLIGRAM(S): at 22:15

## 2021-10-21 NOTE — OB PROVIDER TRIAGE NOTE - NSHPLABSRESULTS_GEN_ALL_CORE
CBC Full  -  ( 21 Oct 2021 22:17 )  WBC Count : 7.57 K/uL  RBC Count : 3.39 M/uL  Hemoglobin : 11.1 g/dL  Hematocrit : 33.7 %  Platelet Count - Automated : 180 K/uL  Mean Cell Volume : 99.4 fL  Mean Cell Hemoglobin : 32.7 pg  Mean Cell Hemoglobin Concentration : 32.9 gm/dL  Auto Neutrophil # : 5.90 K/uL  Auto Lymphocyte # : 0.94 K/uL  Auto Monocyte # : 0.33 K/uL  Auto Eosinophil # : 0.00 K/uL  Auto Basophil # : 0.00 K/uL  Auto Neutrophil % : 77.9 %  Auto Lymphocyte % : 12.4 %  Auto Monocyte % : 4.4 %  Auto Eosinophil % : 0.0 %  Auto Basophil % : 0.0 %  10-21    136  |  104  |  6<L>  ----------------------------<  180<H>  3.8   |  19<L>  |  0.50    Ca    9.2      21 Oct 2021 22:17    TPro  7.1  /  Alb  3.7  /  TBili  <0.2  /  DBili  x   /  AST  15  /  ALT  9   /  AlkPhos  142<H>  10    Urinalysis Basic - ( 21 Oct 2021 22:17 )    Color: Light Yellow / Appearance: Clear / S.010 / pH: x  Gluc: x / Ketone: Negative  / Bili: Negative / Urobili: <2 mg/dL   Blood: x / Protein: Negative / Nitrite: Negative   Leuk Esterase: Negative / RBC: x / WBC x   Sq Epi: x / Non Sq Epi: x / Bacteria: x    PT/INR - ( 21 Oct 2021 22:17 )   PT: 11.0 sec;   INR: 0.96 ratio         PTT - ( 21 Oct 2021 22:17 )  PTT:27.7 sec    reviewed with Dr Morgan

## 2021-10-21 NOTE — OB PROVIDER TRIAGE NOTE - HISTORY OF PRESENT ILLNESS
30 y/o pt 37.5 weeks  with known chronic HTN on Labetalol 100mg BID presents to triage with c/o elevated BP of 146/91 at home and  intermittent headache since yesterday that is 5/10 on numerical scale. pt denies taking any Tylenol for pain relief. pt was seen by Dr Youngblood in office and was" told to come to triage to r/o PEC due to headache ".  pt reports visual changes on 10/20/21 that since resolved. pt denies any epigastric pain. pt denies any lof, bleeding or contractions. pt denies n/v/d, fever or chills. pt endorses +Fetal movement  AP complicated by:  -Chronic HTN on Labetalol 100mg  -Followed by [facility]. Pt was evaluated during pregnancy for tachycardia and SOB and had a 24 hour halter monitor. Pt had an ECHO on 21 and states "it was normal"    NKDA  PMH:   Chronic HTN  Followed by [facility]. pt was seen before pregnancy and was not compliant with HTN meds. Pt was evaluated during pregnancy for tachycardia and SOB and had a 24 hour halter monitor. Pt had an ECHO on 21 and states "it was normal"  PSH:   d&c x1  OB:   2018 F 7#10 PEC  TOP x3 complete x2, 1 incomplete(d&c)  SAB x1 complete  GYN: denies  Social/psych hx: denies  Medications:  PNV  Labetalol 100mg BID  30 y/o pt 37.5 weeks  with known chronic HTN on Labetalol 100mg BID presents to triage with c/o elevated BP of 146/91 at home and  intermittent headache since yesterday that is 5/10 on numerical scale. pt denies taking any Tylenol for pain relief. pt was seen by Dr Youngblood in office and was" told to come to triage to r/o PEC due to headache ".  pt reports visual changes on 10/20/21 that since resolved. pt denies any epigastric pain. pt denies any lof, bleeding or contractions. pt denies n/v/d, fever or chills. pt endorses +Fetal movement  AP complicated by:  -Chronic HTN on Labetalol 100mg  -Followed by [facility]. Pt was evaluated during pregnancy for tachycardia and SOB and had a 24 hour halter monitor. Pt had an ECHO on 21 and states "it was normal"    NKDA  PMH:   Chronic HTN  Followed by [facility]. pt was seen before pregnancy for maternal tachycardia, SOB, and was not compliant with HTN meds. Pt was evaluated during pregnancy for maternal tachycardia and SOB, s/p 24 hour halter monitor. Pt had an ECHO on 21 and states "it was normal"  PSH:   d&c x1  OB:   2018 F 7#10 PEC  TOP x3 complete x2, 1 incomplete(d&c)  SAB x1 complete  GYN: denies  Social/psych hx: denies  Medications:  PNV  Labetalol 100mg BID  32 y/o pt 37.5 weeks  with known chronic HTN on Labetalol 100mg BID presents to triage with c/o elevated BP of 146/91 at home and  intermittent headache since yesterday that is 5/10 on numerical scale. pt denies taking any Tylenol for pain relief. pt last dose of Labetalol on 10/21/21 @10:00am, pt did not take nighttime dose.  pt was seen by Dr Youngblood in office and was" told to come to triage to r/o PEC due to headache ".  pt reports visual changes on 10/20/21 that since resolved. pt denies any epigastric pain. pt denies any lof, bleeding or contractions. pt denies n/v/d, fever or chills. pt endorses +Fetal movement  AP complicated by:  -Chronic HTN on Labetalol 100mg  -Followed by [facility]. Pt was evaluated during pregnancy for tachycardia and SOB and had a 24 hour halter monitor. Pt had an ECHO on 21 and states "it was normal"    NKDA  PMH:   Chronic HTN  Followed by [facility]. pt was seen before pregnancy for maternal tachycardia, SOB, and was not compliant with HTN meds. Pt was evaluated during pregnancy for maternal tachycardia and SOB, s/p 24 hour halter monitor. Pt had an ECHO on 21 and states "it was normal"  PSH:   d&c x1  OB:   2018 F 7#10 PEC  TOP x3 complete x2, 1 incomplete(d&c)  SAB x1 complete  GYN: denies  Social/psych hx: denies  Medications:  PNV  Labetalol 100mg BID

## 2021-10-21 NOTE — OB PROVIDER TRIAGE NOTE - NSOBPROVIDERNOTE_OBGYN_ALL_OB_FT
32 y/o pt 37.5 weeks  known CHTN presents for r/o PEC  Plan:  -PEC labs pending  -NST in progress  -Will continue to monitor 32 y/o pt 37.5 weeks  known CHTN presents for r/o PEC  Plan:  -PEC labs pending  -NST in progress  -Will continue to monitor    @2315  EFM: Baseline 150bpm with minimal variability, no accels  toco irregular ctx noted   Patient reports HA improved post Tylenol, HA is now 3/10 on numerical scale  No evidence of preeclampsia  d/w Dr Morgan   32 y/o pt 37.5 weeks  with known chronic HTN presents with category 2 tracing for IOL  Plan:  -Admit l&d. Routine labs  -IOL PO cytotec  -Prenatal labs pending   -Fetus: cat 2 tracing, vertex presentation, continuous monitoring  -GBS unknown  -Pain: patient denies pain at this time   -Covid 19 pending for patient and support person  -Consents signed and witnessed at bedside 30 y/o pt 37.5 weeks  known CHTN presents for r/o PEC  Plan:  -PEC labs pending  -Labetalol 100mg PO  -Tylenol 1000mg for HA  -NST in progress  -Will continue to monitor    @2315  EFM: Baseline 150bpm with minimal variability, no accels  toco irregular ctx noted   Patient reports HA improved post Tylenol, HA is now 3/10 on numerical scale  No evidence of preeclampsia  d/w Dr Morgan   30 y/o pt 37.5 weeks  with known chronic HTN presents with category 2 tracing for IOL  Plan:  -Admit l&d. Routine labs  -IOL PO cytotec  -Prenatal labs pending   -Fetus: cat 2 tracing, vertex presentation, continuous monitoring  -GBS unknown  -Continue Labetalol 100mg BID   -Pain: patient denies pain at this time   -Covid 19 pending for patient and support person  -Consents signed and witnessed at bedside

## 2021-10-21 NOTE — OB PROVIDER TRIAGE NOTE - NSHPPHYSICALEXAM_GEN_ALL_CORE
Vital Signs Last 24 Hrs  T(C): 36.9 (21 Oct 2021 21:29), Max: 36.9 (21 Oct 2021 20:00)  T(F): 98.42 (21 Oct 2021 21:29), Max: 98.42 (21 Oct 2021 21:29)  HR: 95 (21 Oct 2021 22:31) (93 - 101)  BP: 133/85 (21 Oct 2021 22:17) (128/78 - 134/88)  BP(mean): --  RR: 18 (21 Oct 2021 20:00) (18 - 18)  SpO2: --    Lungs: anterior and posterior lung sounds clear upon auscultation  Cardiac: R/R/R  Abdomen: soft, non tender. no guarding or rebound tenderness    NST in progress Vital Signs Last 24 Hrs  T(C): 36.9 (21 Oct 2021 21:29), Max: 36.9 (21 Oct 2021 20:00)  T(F): 98.42 (21 Oct 2021 21:29), Max: 98.42 (21 Oct 2021 21:29)  HR: 95 (21 Oct 2021 22:31) (93 - 101)  BP: 133/85 (21 Oct 2021 22:17) (128/78 - 134/88)  BP(mean): --  RR: 18 (21 Oct 2021 20:00) (18 - 18)  SpO2: --    Lungs: anterior and posterior lung sounds clear upon auscultation  Cardiac: R/R/R  Abdomen: soft, non tender. no guarding or rebound tenderness  SVE: closed/50/-3  TAS: vertex presentation   EFW 2778gm by Leopold's (sonogram on 10/14 EFW 2576gm)      NST in progress

## 2021-10-22 ENCOUNTER — TRANSCRIPTION ENCOUNTER (OUTPATIENT)
Age: 31
End: 2021-10-22

## 2021-10-22 DIAGNOSIS — Z04.9 ENCOUNTER FOR EXAMINATION AND OBSERVATION FOR UNSPECIFIED REASON: ICD-10-CM

## 2021-10-22 DIAGNOSIS — O28.8 OTHER ABNORMAL FINDINGS ON ANTENATAL SCREENING OF MOTHER: ICD-10-CM

## 2021-10-22 LAB
ALBUMIN SERPL ELPH-MCNC: 3.7 G/DL — SIGNIFICANT CHANGE UP (ref 3.3–5)
ALP SERPL-CCNC: 159 U/L — HIGH (ref 40–120)
ALT FLD-CCNC: 7 U/L — SIGNIFICANT CHANGE UP (ref 4–33)
ANION GAP SERPL CALC-SCNC: 11 MMOL/L — SIGNIFICANT CHANGE UP (ref 7–14)
APTT BLD: 26.5 SEC — LOW (ref 27–36.3)
AST SERPL-CCNC: 15 U/L — SIGNIFICANT CHANGE UP (ref 4–32)
BASOPHILS # BLD AUTO: 0.05 K/UL — SIGNIFICANT CHANGE UP (ref 0–0.2)
BASOPHILS NFR BLD AUTO: 0.7 % — SIGNIFICANT CHANGE UP (ref 0–2)
BILIRUB SERPL-MCNC: 0.2 MG/DL — SIGNIFICANT CHANGE UP (ref 0.2–1.2)
BLD GP AB SCN SERPL QL: NEGATIVE — SIGNIFICANT CHANGE UP
BUN SERPL-MCNC: 4 MG/DL — LOW (ref 7–23)
CALCIUM SERPL-MCNC: 9.7 MG/DL — SIGNIFICANT CHANGE UP (ref 8.4–10.5)
CHLORIDE SERPL-SCNC: 105 MMOL/L — SIGNIFICANT CHANGE UP (ref 98–107)
CO2 SERPL-SCNC: 21 MMOL/L — LOW (ref 22–31)
COVID-19 SPIKE DOMAIN AB INTERP: NEGATIVE — SIGNIFICANT CHANGE UP
COVID-19 SPIKE DOMAIN ANTIBODY RESULT: 0.4 U/ML — SIGNIFICANT CHANGE UP
CREAT SERPL-MCNC: 0.52 MG/DL — SIGNIFICANT CHANGE UP (ref 0.5–1.3)
EOSINOPHIL # BLD AUTO: 0.01 K/UL — SIGNIFICANT CHANGE UP (ref 0–0.5)
EOSINOPHIL NFR BLD AUTO: 0.1 % — SIGNIFICANT CHANGE UP (ref 0–6)
FIBRINOGEN PPP-MCNC: 715 MG/DL — HIGH (ref 290–520)
GLUCOSE SERPL-MCNC: 105 MG/DL — HIGH (ref 70–99)
HCT VFR BLD CALC: 34.7 % — SIGNIFICANT CHANGE UP (ref 34.5–45)
HGB BLD-MCNC: 11.8 G/DL — SIGNIFICANT CHANGE UP (ref 11.5–15.5)
HIV 1+2 AB+HIV1 P24 AG SERPL QL IA: SIGNIFICANT CHANGE UP
IANC: 4.6 K/UL — SIGNIFICANT CHANGE UP (ref 1.5–8.5)
IMM GRANULOCYTES NFR BLD AUTO: 3.5 % — HIGH (ref 0–1.5)
INR BLD: 0.95 RATIO — SIGNIFICANT CHANGE UP (ref 0.88–1.16)
LDH SERPL L TO P-CCNC: 146 U/L — SIGNIFICANT CHANGE UP (ref 135–225)
LYMPHOCYTES # BLD AUTO: 1.3 K/UL — SIGNIFICANT CHANGE UP (ref 1–3.3)
LYMPHOCYTES # BLD AUTO: 19.1 % — SIGNIFICANT CHANGE UP (ref 13–44)
MCHC RBC-ENTMCNC: 33.5 PG — SIGNIFICANT CHANGE UP (ref 27–34)
MCHC RBC-ENTMCNC: 34 GM/DL — SIGNIFICANT CHANGE UP (ref 32–36)
MCV RBC AUTO: 98.6 FL — SIGNIFICANT CHANGE UP (ref 80–100)
MONOCYTES # BLD AUTO: 0.61 K/UL — SIGNIFICANT CHANGE UP (ref 0–0.9)
MONOCYTES NFR BLD AUTO: 9 % — SIGNIFICANT CHANGE UP (ref 2–14)
NEUTROPHILS # BLD AUTO: 4.6 K/UL — SIGNIFICANT CHANGE UP (ref 1.8–7.4)
NEUTROPHILS NFR BLD AUTO: 67.6 % — SIGNIFICANT CHANGE UP (ref 43–77)
NRBC # BLD: 0 /100 WBCS — SIGNIFICANT CHANGE UP
NRBC # FLD: 0 K/UL — SIGNIFICANT CHANGE UP
PLATELET # BLD AUTO: 170 K/UL — SIGNIFICANT CHANGE UP (ref 150–400)
POTASSIUM SERPL-MCNC: 3.7 MMOL/L — SIGNIFICANT CHANGE UP (ref 3.5–5.3)
POTASSIUM SERPL-SCNC: 3.7 MMOL/L — SIGNIFICANT CHANGE UP (ref 3.5–5.3)
PROT SERPL-MCNC: 6.8 G/DL — SIGNIFICANT CHANGE UP (ref 6–8.3)
PROTHROM AB SERPL-ACNC: 11 SEC — SIGNIFICANT CHANGE UP (ref 10.6–13.6)
RBC # BLD: 3.52 M/UL — LOW (ref 3.8–5.2)
RBC # FLD: 14.3 % — SIGNIFICANT CHANGE UP (ref 10.3–14.5)
RH IG SCN BLD-IMP: POSITIVE — SIGNIFICANT CHANGE UP
RH IG SCN BLD-IMP: POSITIVE — SIGNIFICANT CHANGE UP
SARS-COV-2 IGG+IGM SERPL QL IA: 0.4 U/ML — SIGNIFICANT CHANGE UP
SARS-COV-2 IGG+IGM SERPL QL IA: NEGATIVE — SIGNIFICANT CHANGE UP
SARS-COV-2 RNA SPEC QL NAA+PROBE: SIGNIFICANT CHANGE UP
SODIUM SERPL-SCNC: 137 MMOL/L — SIGNIFICANT CHANGE UP (ref 135–145)
T PALLIDUM AB TITR SER: NEGATIVE — SIGNIFICANT CHANGE UP
URATE SERPL-MCNC: 4.1 MG/DL — SIGNIFICANT CHANGE UP (ref 2.5–7)
WBC # BLD: 6.81 K/UL — SIGNIFICANT CHANGE UP (ref 3.8–10.5)
WBC # FLD AUTO: 6.81 K/UL — SIGNIFICANT CHANGE UP (ref 3.8–10.5)

## 2021-10-22 RX ORDER — INFLUENZA VIRUS VACCINE 15; 15; 15; 15 UG/.5ML; UG/.5ML; UG/.5ML; UG/.5ML
0.5 SUSPENSION INTRAMUSCULAR ONCE
Refills: 0 | Status: DISCONTINUED | OUTPATIENT
Start: 2021-10-22 | End: 2021-10-24

## 2021-10-22 RX ORDER — SIMETHICONE 80 MG/1
80 TABLET, CHEWABLE ORAL EVERY 4 HOURS
Refills: 0 | Status: DISCONTINUED | OUTPATIENT
Start: 2021-10-22 | End: 2021-10-24

## 2021-10-22 RX ORDER — FAMOTIDINE 10 MG/ML
20 INJECTION INTRAVENOUS DAILY
Refills: 0 | Status: DISCONTINUED | OUTPATIENT
Start: 2021-10-22 | End: 2021-10-24

## 2021-10-22 RX ORDER — OXYCODONE HYDROCHLORIDE 5 MG/1
5 TABLET ORAL ONCE
Refills: 0 | Status: DISCONTINUED | OUTPATIENT
Start: 2021-10-22 | End: 2021-10-24

## 2021-10-22 RX ORDER — MAGNESIUM HYDROXIDE 400 MG/1
30 TABLET, CHEWABLE ORAL
Refills: 0 | Status: DISCONTINUED | OUTPATIENT
Start: 2021-10-22 | End: 2021-10-24

## 2021-10-22 RX ORDER — KETOROLAC TROMETHAMINE 30 MG/ML
30 SYRINGE (ML) INJECTION ONCE
Refills: 0 | Status: DISCONTINUED | OUTPATIENT
Start: 2021-10-22 | End: 2021-10-22

## 2021-10-22 RX ORDER — OXYTOCIN 10 UNIT/ML
333.33 VIAL (ML) INJECTION
Qty: 20 | Refills: 0 | Status: DISCONTINUED | OUTPATIENT
Start: 2021-10-22 | End: 2021-10-23

## 2021-10-22 RX ORDER — PRAMOXINE HYDROCHLORIDE 150 MG/15G
1 AEROSOL, FOAM RECTAL EVERY 4 HOURS
Refills: 0 | Status: DISCONTINUED | OUTPATIENT
Start: 2021-10-22 | End: 2021-10-24

## 2021-10-22 RX ORDER — OXYTOCIN 10 UNIT/ML
2 VIAL (ML) INJECTION
Qty: 30 | Refills: 0 | Status: DISCONTINUED | OUTPATIENT
Start: 2021-10-22 | End: 2021-10-23

## 2021-10-22 RX ORDER — DIBUCAINE 1 %
1 OINTMENT (GRAM) RECTAL EVERY 6 HOURS
Refills: 0 | Status: DISCONTINUED | OUTPATIENT
Start: 2021-10-22 | End: 2021-10-24

## 2021-10-22 RX ORDER — LANOLIN
1 OINTMENT (GRAM) TOPICAL EVERY 6 HOURS
Refills: 0 | Status: DISCONTINUED | OUTPATIENT
Start: 2021-10-22 | End: 2021-10-24

## 2021-10-22 RX ORDER — OXYCODONE HYDROCHLORIDE 5 MG/1
5 TABLET ORAL
Refills: 0 | Status: DISCONTINUED | OUTPATIENT
Start: 2021-10-22 | End: 2021-10-24

## 2021-10-22 RX ORDER — TETANUS TOXOID, REDUCED DIPHTHERIA TOXOID AND ACELLULAR PERTUSSIS VACCINE, ADSORBED 5; 2.5; 8; 8; 2.5 [IU]/.5ML; [IU]/.5ML; UG/.5ML; UG/.5ML; UG/.5ML
0.5 SUSPENSION INTRAMUSCULAR ONCE
Refills: 0 | Status: COMPLETED | OUTPATIENT
Start: 2021-10-22

## 2021-10-22 RX ORDER — IBUPROFEN 200 MG
600 TABLET ORAL EVERY 6 HOURS
Refills: 0 | Status: COMPLETED | OUTPATIENT
Start: 2021-10-22 | End: 2022-09-20

## 2021-10-22 RX ORDER — HYDROCORTISONE 1 %
1 OINTMENT (GRAM) TOPICAL EVERY 6 HOURS
Refills: 0 | Status: DISCONTINUED | OUTPATIENT
Start: 2021-10-22 | End: 2021-10-24

## 2021-10-22 RX ORDER — DIPHENHYDRAMINE HCL 50 MG
25 CAPSULE ORAL EVERY 6 HOURS
Refills: 0 | Status: DISCONTINUED | OUTPATIENT
Start: 2021-10-22 | End: 2021-10-24

## 2021-10-22 RX ORDER — AER TRAVELER 0.5 G/1
1 SOLUTION RECTAL; TOPICAL EVERY 4 HOURS
Refills: 0 | Status: DISCONTINUED | OUTPATIENT
Start: 2021-10-22 | End: 2021-10-24

## 2021-10-22 RX ORDER — SODIUM CHLORIDE 9 MG/ML
3 INJECTION INTRAMUSCULAR; INTRAVENOUS; SUBCUTANEOUS EVERY 8 HOURS
Refills: 0 | Status: DISCONTINUED | OUTPATIENT
Start: 2021-10-22 | End: 2021-10-24

## 2021-10-22 RX ORDER — FERROUS SULFATE 325(65) MG
0 TABLET ORAL
Qty: 0 | Refills: 0 | DISCHARGE

## 2021-10-22 RX ORDER — ACETAMINOPHEN 500 MG
975 TABLET ORAL
Refills: 0 | Status: DISCONTINUED | OUTPATIENT
Start: 2021-10-22 | End: 2021-10-24

## 2021-10-22 RX ORDER — BENZOCAINE 10 %
1 GEL (GRAM) MUCOUS MEMBRANE EVERY 6 HOURS
Refills: 0 | Status: DISCONTINUED | OUTPATIENT
Start: 2021-10-22 | End: 2021-10-24

## 2021-10-22 RX ADMIN — Medication 975 MILLIGRAM(S): at 23:17

## 2021-10-22 RX ADMIN — Medication 2 MILLIUNIT(S)/MIN: at 13:29

## 2021-10-22 RX ADMIN — SODIUM CHLORIDE 3 MILLILITER(S): 9 INJECTION INTRAMUSCULAR; INTRAVENOUS; SUBCUTANEOUS at 22:00

## 2021-10-22 RX ADMIN — Medication 975 MILLIGRAM(S): at 23:55

## 2021-10-22 RX ADMIN — Medication 30 MILLIGRAM(S): at 20:19

## 2021-10-22 RX ADMIN — FAMOTIDINE 20 MILLIGRAM(S): 10 INJECTION INTRAVENOUS at 04:30

## 2021-10-22 RX ADMIN — Medication 1000 MILLIUNIT(S)/MIN: at 18:38

## 2021-10-22 RX ADMIN — Medication 30 MILLIGRAM(S): at 19:54

## 2021-10-22 NOTE — OB PROVIDER H&P - NSHPPHYSICALEXAM_GEN_ALL_CORE
Vital Signs Last 24 Hrs  T(C): 36.9 (21 Oct 2021 21:29), Max: 36.9 (21 Oct 2021 20:00)  T(F): 98.42 (21 Oct 2021 21:29), Max: 98.42 (21 Oct 2021 21:29)  HR: 95 (21 Oct 2021 22:31) (93 - 101)  BP: 133/85 (21 Oct 2021 22:17) (128/78 - 134/88)  BP(mean): --  RR: 18 (21 Oct 2021 20:00) (18 - 18)  SpO2: --    Lungs: anterior and posterior lung sounds clear upon auscultation  Cardiac: R/R/R  Abdomen: soft, non tender. no guarding or rebound tenderness  SVE: closed/50/-3  TAS: vertex presentation   EFW 2778gm by Leopold's (sonogram on 10/14 EFW 2576gm)      NST in progress

## 2021-10-22 NOTE — CHART NOTE - NSCHARTNOTEFT_GEN_A_CORE
PA Note    feeling rectal pressure    VS  T(C): 37.0 (10-22-21 @ 15:43)  HR: 91 (10-22-21 @ 17:07)  BP: 129/85 (10-22-21 @ 17:01)  RR: 16 (10-22-21 @ 00:09)  SpO2: 100% (10-22-21 @ 17:07)    145/mod cindy/+accels/no decels  [place] q 4-5min  10/100/0    cont efm/toco   anticipated  dw Dr Eileen villa pa
PA note    patient seen & examined for cont of management  comfortable with epidural    VS  T(C): 37.0 (10-22-21 @ 12:12)  HR: 77 (10-22-21 @ 15:01)  BP: 119/- (10-22-21 @ 14:58)  RR: 16 (10-22-21 @ 00:09)  SpO2: 100% (10-22-21 @ 15:16)    130/mod cindy/+accels/no decels  [place] q 3-5min  3.5/70/-2 AROM clear    cont efm/toco  cont pitocin  deepak christie
PA note    seen & examined for cont of management  comfortable    VS  T(C): 36.7 (10-22-21 @ 00:09)  HR: 101 (10-22-21 @ 06:57)  BP: 115/76 (10-22-21 @ 06:36)  RR: 16 (10-22-21 @ 00:09)  SpO2: 100% (10-22-21 @ 06:52)    /mod cindy/no decels  [place] q4-7min  3/60/-3    cont emf/toco  plan for pitocin  dw Dr Eddie christie

## 2021-10-22 NOTE — OB PROVIDER H&P - HISTORY OF PRESENT ILLNESS
32 y/o pt 37.5 weeks  with known chronic HTN on Labetalol 100mg BID presents to triage with c/o elevated BP of 146/91 at home and  intermittent headache since yesterday that is 5/10 on numerical scale. pt denies taking any Tylenol for pain relief. pt was seen by Dr Youngblood in office and was" told to come to triage to r/o PEC due to headache ".  pt reports visual changes on 10/20/21 that since resolved. pt denies any epigastric pain. pt denies any lof, bleeding or contractions. pt denies n/v/d, fever or chills. pt endorses +Fetal movement  IOL on 10/26/21  AP complicated by:  -Chronic HTN on Labetalol 100mg  -Followed by [facility]. Pt was evaluated during pregnancy for tachycardia and SOB and had a 24 hour halter monitor. Pt had an ECHO on 21 and states "it was normal"    NKDA  PMH:   Chronic HTN  Followed by [facility]. pt was seen before pregnancy for maternal tachycardia, SOB, and was not compliant with HTN meds. Pt was evaluated during pregnancy for maternal tachycardia and SOB, s/p 24 hour halter monitor. Pt had an ECHO on 21 and states "it was normal"  PSH:   d&c x1  OB:   2018 F 7#10 PEC  TOP x3 complete x2, 1 incomplete(d&c)  SAB x1 complete  GYN: denies  Social/psych hx: denies  Medications:  PNV  Labetalol 100mg BID

## 2021-10-22 NOTE — OB RN DELIVERY SUMMARY - NS_SEPSISRSKCALC_OBGYN_ALL_OB_FT
EOS calculated successfully. EOS Risk Factor: 0.5/1000 live births ([facility] national incidence); GA=37w6d; Temp=98.6; ROM=2.25; GBS='Negative'; Antibiotics='No antibiotics or any antibiotics < 2 hrs prior to birth'

## 2021-10-22 NOTE — DISCHARGE NOTE OB - CARE PROVIDER_API CALL
Estefania Youngblood)  Obstetrics and Gynecology  [address], [address]  [place] [address]  Phone: [phone]  Fax: [phone]  Follow Up Time:

## 2021-10-22 NOTE — DISCHARGE NOTE OB - MEDICATION SUMMARY - MEDICATIONS TO CHANGE
I will SWITCH the dose or number of times a day I take the medications listed below when I get home from the hospital:    Prenatal Multivitamins with Folic Acid 1 mg oral tablet  -- 1 tab(s) by mouth once a day    ferrous sulfate 75 mg (15 mg elemental iron) oral tablet    Labetolol 100mg BID

## 2021-10-22 NOTE — DISCHARGE NOTE OB - MEDICATION SUMMARY - MEDICATIONS TO STOP TAKING
I will STOP taking the medications listed below when I get home from the hospital:    acetaminophen 325 mg oral tablet  -- 3 tab(s) by mouth every 6 hours, As Needed

## 2021-10-22 NOTE — DISCHARGE NOTE OB - MEDICATION SUMMARY - MEDICATIONS TO TAKE
I will START or STAY ON the medications listed below when I get home from the hospital:  None I will START or STAY ON the medications listed below when I get home from the hospital:    labetalol 100 mg oral tablet  -- 1 tab(s) by mouth 2 times a day   -- It is very important that you take or use this exactly as directed.  Do not skip doses or discontinue unless directed by your doctor.  May cause drowsiness or dizziness.  Some non-prescription drugs may aggravate your condition.  Read all labels carefully.  If a warning appears, check with your doctor before taking.    -- Indication: For CHTN

## 2021-10-22 NOTE — DISCHARGE NOTE OB - MATERIALS PROVIDED
[facility] [place] Screening Program/[place]  Immunization Record/Breastfeeding Log/Breastfeeding Mother’s Support Group Information/Guide to Postpartum Care/[facility] Hearing Screen Program/Back To Sleep Handout/Shaken Baby Prevention Handout/Breastfeeding Guide and Packet/Birth Certificate Instructions/Discharge Medication Information for Patients and Families Pocket Guide

## 2021-10-22 NOTE — OB PROVIDER H&P - PROBLEM SELECTOR PLAN 1
-Admit l&d. Routine labs  -IOL PO cytotec  -Prenatal labs pending   -Fetus: cat 2 tracing, vertex presentation, continuous monitoring  -GBS unknown  -Continue Labetalol 100mg BID   -Pain: patient denies pain at this time   -Covid 19 pending for patient and support person  -Consents signed and witnessed at bedside

## 2021-10-22 NOTE — OB RN DELIVERY SUMMARY - NSSELHIDDEN_OBGYN_ALL_OB_FT
[NS_DeliveryAttending1_OBGYN_ALL_OB_FT:[id number]],[NS_DeliveryRN_OBGYN_ALL_OB_FT:[id number]==]

## 2021-10-22 NOTE — OB PROVIDER H&P - ASSESSMENT
30 y/o pt 37.5 weeks  known CHTN presents for r/o PEC  Plan:  -PEC labs pending  -Labetalol 100mg PO  -Tylenol 1000mg for HA  -NST in progress  -Will continue to monitor    @2315  EFM: Baseline 150bpm with minimal variability, no accels  toco irregular ctx noted   Patient reports HA improved post Tylenol, HA is now 3/10 on numerical scale  No evidence of preeclampsia  d/w Dr Morgan   30 y/o pt 37.5 weeks  with known chronic HTN presents with category 2 tracing for IOL  Plan:  -Admit l&d. Routine labs  -IOL PO cytotec  -Prenatal labs pending   -Fetus: cat 2 tracing, vertex presentation, continuous monitoring  -GBS unknown  -Continue Labetalol 100mg BID   -Pain: patient denies pain at this time   -Covid 19 pending for patient and support person  -Consents signed and witnessed at bedside

## 2021-10-22 NOTE — DISCHARGE NOTE OB - ADDITIONAL INSTRUCTIONS
follow up 6 weeks Please call Dr. Youngblood's office to make an appointment for 1 week for a B/P check and follow-up of your B/P's

## 2021-10-22 NOTE — OB PROVIDER H&P - NS_MATERNALTRANS_OBGYN_ALL_OB
S: Patient/daughter is doing very well.  She did fall last week with some secondary increase in jaw discomfort but it seems to be improving  O:Alert NAD  A: Postherpetic neuralgia  P:Acu per note.  Pre-Procedure:  Patient's Name and Date of Birth verified:  YES  Verified By:  brandy (initials)  Diagnosis:  Data Unavailable  Interval History:  3w  Complications and/or Adverse Effects of Last Acupuncture Treatment: 0   Site Marking and Verification:  Verification of site selection (based on symptoms and condition:  YES  Verified by: brandy (initials)  Patient identification verified by provider: YES  Verified by: brandy (initials)  Pause for the Cause:  YES  Verified by: brandy (initials)   Procedure Note:  Acupuncture Treatment Number: 12  Acupuncture Points Selected: BODY:Four Pereyra. Mixed facial points L side predominant. AUR:ASP 1-4.    Electrical Stim: No  Frequency 0 HZ    Number of needles:  13      Re-insertion/Manipulation of needles after initial 15 minutes: YES  Time:  30   Minutes   Post-Procedure:  Complication/Adverse Effects: 0      Management:  0      Signature:  Juan Can MD        No

## 2021-10-22 NOTE — DISCHARGE NOTE OB - PATIENT PORTAL LINK FT
You can access the FollowMyHealth Patient Portal offered by [facility] by registering at the following website: [email or web address]. By joining [facility]’s FollowMyHealth portal, you will also be able to view your health information using other applications (apps) compatible with our system.

## 2021-10-22 NOTE — DISCHARGE NOTE OB - CARE PLAN
Principal Discharge DX:	Normal vaginal delivery  Assessment and plan of treatment:	follow up 6 weeks/ regular diet & exercise as tolerate   1

## 2021-10-22 NOTE — OB RN DELIVERY SUMMARY - NS_LABORCHARACTER_OBGYN_ALL_OB
History  Chief Complaint   Patient presents with    Abdominal Pain     lower abdominal pain with diarrhea and intermittent nausea and vomiting  patient reports symptoms started over the summer  This is a 26year old male who comes to the ED with c/o intermittent lower abdominal pain for several months worsening over the last 3 weeks  Pt was seen at [facility] in 8/2018 and followed up with urology for penis pain was given doxycyline but states he did not finish it  He states he is concerned that his "STI has returned"  He states that he also has nausea and diarrhea that has been intermittent since as well  Denies fevers, chills, vomiting  Pt is a poor historian  History provided by:  Patient and medical records  History limited by:  Psychiatric disorder   used: No    Abdominal Pain   Pain location:  Suprapubic  Pain quality: cramping and fullness    Progression:  Waxing and waning  Chronicity:  Chronic  Associated symptoms: diarrhea and nausea        Prior to Admission Medications   Prescriptions Last Dose Informant Patient Reported? Taking?   sertraline (ZOLOFT) 25 mg tablet  Self Yes No   Sig: Take 25 mg by mouth daily Taking 1 5 tablets daily       Facility-Administered Medications: None       Past Medical History:   Diagnosis Date    Anxiety     Last assessed 10/18/16    Asthma     Last assessed 03/15/13    Intermittent explosive disorder     Oppositional defiant disorder     Schizophrenia ([facility] [address] )     last assessed 12/04/17    Scoliosis        Past Surgical History:   Procedure Laterality Date    HERNIA REPAIR      NO PAST SURGERIES         Family History   Problem Relation Age of Onset    Diabetes Mother     Bipolar disorder Family     Schizophrenia Family     Diabetes type II Family      I have reviewed and agree with the history as documented      Social History   Substance Use Topics    Smoking status: Current Every Day Smoker    Smokeless tobacco: Never Used Comment: Nicotine vapor use    Alcohol use No        Review of Systems   Constitutional: Negative  HENT: Negative  Eyes: Negative  Respiratory: Negative  Cardiovascular: Negative  Gastrointestinal: Positive for abdominal pain, diarrhea and nausea  Endocrine: Negative  Genitourinary: Positive for discharge  Clear penile discharge    Musculoskeletal: Negative  Skin: Negative  Allergic/Immunologic: Negative  Neurological: Negative  Hematological: Negative  Psychiatric/Behavioral: Negative  Physical Exam  Physical Exam   Constitutional: He is oriented to person, place, and time  He appears well-developed and well-nourished  No distress  Very anxious - no eye contact    HENT:   Head: Normocephalic and atraumatic  Eyes: Pupils are equal, round, and reactive to light  EOM are normal    Neck: Normal range of motion  Neck supple  Cardiovascular: Normal rate, regular rhythm and normal heart sounds  Pulmonary/Chest: Effort normal and breath sounds normal    Abdominal: Soft  Bowel sounds are normal  He exhibits no distension  There is tenderness  RLQ tenderness  + McBurney point tenderness  + rebound tenderness  + ashok umbilical tenderness    Musculoskeletal: Normal range of motion  Neurological: He is alert and oriented to person, place, and time  Skin: Skin is warm and dry  Capillary refill takes less than 2 seconds  He is not diaphoretic  Psychiatric: He has a normal mood and affect  His behavior is normal  Judgment and thought content normal    Nursing note and vitals reviewed        Vital Signs  ED Triage Vitals   Temperature Pulse Respirations Blood Pressure SpO2   01/08/19 2035 01/08/19 2035 01/08/19 2035 01/08/19 2035 01/08/19 2035   97 9 °F (36 6 °C) (!) 118 16 (!) 160/108 98 %      Temp Source Heart Rate Source Patient Position - Orthostatic VS BP Location FiO2 (%)   01/08/19 2035 01/08/19 2238 01/08/19 2035 01/08/19 2035 --   Oral Monitor Sitting Right arm       Pain Score       01/08/19 2238       No Pain           Vitals:    01/08/19 2035 01/08/19 2238   BP: (!) 160/108 143/94   Pulse: (!) 118 (!) 134   Patient Position - Orthostatic VS: Sitting Sitting       Visual Acuity      ED Medications  Medications   sodium chloride 0 9 % bolus 1,000 mL (0 mL Intravenous Stopped 1/8/19 2243)   iohexol (OMNIPAQUE) 350 MG/ML injection (MULTI-DOSE) 100 mL (100 mL Intravenous Given 1/8/19 2159)   potassium chloride (K-DUR,KLOR-CON) CR tablet 40 mEq (40 mEq Oral Given 1/8/19 2233)       Diagnostic Studies  Results Reviewed     Procedure Component Value Units Date/Time    Sedimentation rate, automated [[id number]]  (Normal) Collected:  01/08/19 2104    Lab Status:  Final result Specimen:  Blood from Arm, Right Updated:  01/08/19 2243     Sed Rate 1 mm/hour     Rapid drug screen, urine [[id number]]  (Normal) Collected:  01/08/19 2154    Lab Status:  Final result Specimen:  Urine from Urine, Clean Catch Updated:  01/08/19 2224     Amph/Meth UR Negative     Barbiturate Ur Negative     Benzodiazepine Urine Negative     Cocaine Urine Negative     Methadone Urine Negative     Opiate Urine Negative     PCP Ur Negative     THC Urine Negative    Narrative:         FOR MEDICAL PURPOSES ONLY  IF CONFIRMATION NEEDED PLEASE CONTACT THE LAB WITHIN 5 DAYS      Drug Screen Cutoff Levels:  AMPHETAMINE/METHAMPHETAMINES  1000 ng/mL  BARBITURATES     200 ng/mL  BENZODIAZEPINES     200 ng/mL  COCAINE      300 ng/mL  METHADONE      300 ng/mL  OPIATES      300 ng/mL  PHENCYCLIDINE     25 ng/mL  THC       50 ng/mL    C-reactive protein [[id number]]  (Normal) Collected:  01/08/19 2104    Lab Status:  Final result Specimen:  Blood from Arm, Right Updated:  01/08/19 2211     CRP <3 0 mg/L     Magnesium [[id number]]  (Normal) Collected:  01/08/19 2104    Lab Status:  Final result Specimen:  Blood from Arm, Right Updated:  01/08/19 2135     Magnesium 2 0 mg/dL     Comprehensive metabolic panel [[id number]]  (Abnormal) Collected:  01/08/19 2104    Lab Status:  Final result Specimen:  Blood from Arm, Right Updated:  01/08/19 2135     Sodium 139 mmol/L      Potassium 3 0 (L) mmol/L      Chloride 101 mmol/L      CO2 26 mmol/L      ANION GAP 12 mmol/L      BUN 5 mg/dL      Creatinine 1 14 mg/dL      Glucose 127 mg/dL      Calcium 10 0 mg/dL      AST 18 U/L      ALT 28 U/L      Alkaline Phosphatase 74 U/L      Total Protein 8 0 g/dL      Albumin 4 8 g/dL      Total Bilirubin 0 86 mg/dL      eGFR 90 ml/min/1 73sq m     Narrative:         National Kidney Disease Education Program recommendations are as follows:  GFR calculation is accurate only with a steady state creatinine  Chronic Kidney disease less than 60 ml/min/1 73 sq  meters  Kidney failure less than 15 ml/min/1 73 sq  meters  Lipase [[id number]]  (Normal) Collected:  01/08/19 2104    Lab Status:  Final result Specimen:  Blood from Arm, Right Updated:  01/08/19 2135     Lipase 179 u/L     Chlamydia/GC amplified DNA by PCR [[id number]] Collected:  01/08/19 2104    Lab Status:   In process Specimen:  Urine from Urine, Other Updated:  01/08/19 2117    CBC and differential [[id number]]  (Abnormal) Collected:  01/08/19 2104    Lab Status:  Final result Specimen:  Blood from Arm, Right Updated:  01/08/19 2112     WBC 22 42 (H) Thousand/uL      RBC 5 54 Million/uL      Hemoglobin 17 9 (H) g/dL      Hematocrit 49 3 %      MCV 89 fL      MCH 32 3 pg      MCHC 36 3 g/dL      RDW 11 9 %      MPV 10 3 fL      Platelets 218 Thousands/uL      nRBC 0 /100 WBCs      Neutrophils Relative 89 (H) %      Immat GRANS % 0 %      Lymphocytes Relative 7 (L) %      Monocytes Relative 4 %      Eosinophils Relative 0 %      Basophils Relative 0 %      Neutrophils Absolute 19 67 (H) Thousands/µL      Immature Grans Absolute 0 10 Thousand/uL      Lymphocytes Absolute 1 65 Thousands/µL      Monocytes Absolute 0 91 Thousand/µL      Eosinophils Absolute 0 03 Thousand/µL      Basophils Absolute 0 06 Thousands/µL     POCT urinalysis dipstick [[id number]]  (Normal) Resulted:  01/08/19 2108    Lab Status:  Final result Specimen:  Urine Updated:  01/08/19 2108     Color, UA yellow    ED Urine Macroscopic [[id number]] Collected:  01/08/19 2124    Lab Status:  Final result Specimen:  Urine Updated:  01/08/19 2108     Color, UA Yellow     Clarity, UA Clear     pH, UA 7 0     Leukocytes, UA Negative     Nitrite, UA Negative     Protein, UA Negative mg/dl      Glucose, UA Negative mg/dl      Ketones, UA Negative mg/dl      Urobilinogen, UA 0 2 E U /dl      Bilirubin, UA Negative     Blood, UA Negative     Specific Gravity, UA <=1 005    Narrative:       CLINITEK RESULT                 CT abdomen pelvis with contrast   Final Result by Leda Pettit MD (01/08 2218)      Questionable/possible mild early acute appendicitis  Follow-up CT recommended in 24-48 hours  Surgical consult  Findings were marked as "immediate"in Epic and will now be related to the ordering physician or covering clinical team by the radiology liaison  Workstation performed: [id number]                    Procedures  Procedures       Phone Contacts  ED Phone Contact    ED Course  ED Course as of Jan 08 2311   Tue Jan 08, 2019   2222 IMPRESSION:     Questionable/possible mild early acute appendicitis  Follow-up CT recommended in 24-48 hours  Surgical consult      Findings were marked as "immediate"in Epic and will now be related to the ordering physician or covering clinical team by the radiology liaison        2223 Labs 22 42  K 3 0  will repleate  CT possible early appendicitis  Will speak with surgery  Patient is informed of lab and radiology results  [address] with Dr Amber Villarreal  He will review CT and call me back  [phone] Dr Amber Villarreal will accept pt for general surgery service to monitor and repeat labs in AM   Dr Amber Villarreal states no abx at this time since pt is afebrile  Pt agrees with POC  MDM  Number of Diagnoses or Management Options  Diagnosis management comments: Differential diagnosis  Epididymitis  Orchitis  STI  Gastroenteritis  Colitis  Enteritis  Chron's      Plan  Labs  Urine   gc chlamydia               Amount and/or Complexity of Data Reviewed  Clinical lab tests: ordered and reviewed  Tests in the radiology section of CPT®: ordered and reviewed  Review and summarize past medical records: yes      CritCare Time    Disposition  Final diagnoses:   Hypokalemia   Abdominal pain, unspecified abdominal location - questionable early appendicits   Leukocytosis, unspecified type   Intellectual disability   Schizophrenia ([facility] )   Abnormal CT of the abdomen     Time reflects when diagnosis was documented in both MDM as applicable and the Disposition within this note     Time User Action Codes Description Comment    1/8/2019 10:29 PM Luis Angel Backers Add [E87 6] Hypokalemia     1/8/2019 10:30 PM Jackquelyn Lindsey Appendicitis     1/8/2019 10:30 PM Rollen Lis [R10 9] Abdominal pain, unspecified abdominal location     1/8/2019 10:30 PM Rollen Lis [D72 829] Leukocytosis, unspecified type     1/8/2019 10:30 PM Irlanda Bi [E87 6] Hypokalemia     1/8/2019 10:30 PM Jess Juan A Appendicitis     1/8/2019 10:30 PM Luis Angel Backers Add [F79] Intellectual disability     1/8/2019 10:30 PM Rollen Lis [F20 9] Schizophrenia ([facility] )     1/8/2019 11:02 PM Irlanda Bi [E87 6] Hypokalemia     1/8/2019 11:02 PM Ardeth Brothers Appendicitis     1/8/2019 11:02 PM Rollen Lis [R93 5] Abnormal CT of the abdomen     1/8/2019 11:02 PM Irlanda Bi [R10 9] Abdominal pain, unspecified abdominal location questionable early appendicits    1/8/2019 11:02 PM Irlanda Bi [E87 6] Hypokalemia     1/8/2019 11:02 PM Irlanda Bi [R10 9] Abdominal pain, unspecified abdominal location questionable early appendicits      ED Disposition     ED Disposition Condition Comment    Admit  Case was discussed with General surgery  and the patient's admission status was agreed to be Admission Status: observation status to the service of Dr Sosa   Follow-up Information    None         Patient's Medications   Discharge Prescriptions    No medications on file     No discharge procedures on file      ED Provider  Electronically Signed by           Isabel Cornejo  01/08/19 5171 Induction of labor-AROM/Induction of labor-Medicinal/Augmentation of labor/External electronic FM

## 2021-10-22 NOTE — OB PROVIDER DELIVERY SUMMARY - NSSELHIDDEN_OBGYN_ALL_OB_FT
[NS_DeliveryAttending1_OBGYN_ALL_OB_FT:[id number]==],[NS_DeliveryRN_OBGYN_ALL_OB_FT:[id number]]

## 2021-10-23 LAB
HBV SURFACE AG SERPL QL IA: SIGNIFICANT CHANGE UP
RUBV IGG SER-ACNC: 3.4 INDEX — SIGNIFICANT CHANGE UP
RUBV IGG SER-IMP: POSITIVE — SIGNIFICANT CHANGE UP

## 2021-10-23 RX ORDER — LABETALOL HCL 100 MG
1 TABLET ORAL
Qty: 60 | Refills: 0
Start: 2021-10-23 | End: 2021-11-21

## 2021-10-23 RX ORDER — IBUPROFEN 200 MG
600 TABLET ORAL EVERY 6 HOURS
Refills: 0 | Status: DISCONTINUED | OUTPATIENT
Start: 2021-10-23 | End: 2021-10-24

## 2021-10-23 RX ORDER — TETANUS TOXOID, REDUCED DIPHTHERIA TOXOID AND ACELLULAR PERTUSSIS VACCINE, ADSORBED 5; 2.5; 8; 8; 2.5 [IU]/.5ML; [IU]/.5ML; UG/.5ML; UG/.5ML; UG/.5ML
0.5 SUSPENSION INTRAMUSCULAR ONCE
Refills: 0 | Status: COMPLETED | OUTPATIENT
Start: 2021-10-23 | End: 2021-10-23

## 2021-10-23 RX ADMIN — SODIUM CHLORIDE 3 MILLILITER(S): 9 INJECTION INTRAMUSCULAR; INTRAVENOUS; SUBCUTANEOUS at 05:45

## 2021-10-23 RX ADMIN — Medication 975 MILLIGRAM(S): at 22:00

## 2021-10-23 RX ADMIN — Medication 975 MILLIGRAM(S): at 07:07

## 2021-10-23 RX ADMIN — Medication 975 MILLIGRAM(S): at 21:08

## 2021-10-23 RX ADMIN — FAMOTIDINE 20 MILLIGRAM(S): 10 INJECTION INTRAVENOUS at 12:15

## 2021-10-23 RX ADMIN — SODIUM CHLORIDE 3 MILLILITER(S): 9 INJECTION INTRAMUSCULAR; INTRAVENOUS; SUBCUTANEOUS at 22:43

## 2021-10-23 RX ADMIN — Medication 600 MILLIGRAM(S): at 08:45

## 2021-10-23 RX ADMIN — Medication 600 MILLIGRAM(S): at 15:07

## 2021-10-23 RX ADMIN — Medication 600 MILLIGRAM(S): at 03:26

## 2021-10-23 RX ADMIN — Medication 1 TABLET(S): at 12:15

## 2021-10-23 RX ADMIN — TETANUS TOXOID, REDUCED DIPHTHERIA TOXOID AND ACELLULAR PERTUSSIS VACCINE, ADSORBED 0.5 MILLILITER(S): 5; 2.5; 8; 8; 2.5 SUSPENSION INTRAMUSCULAR at 18:01

## 2021-10-23 RX ADMIN — Medication 600 MILLIGRAM(S): at 04:00

## 2021-10-24 VITALS
HEART RATE: 85 BPM | TEMPERATURE: 98 F | SYSTOLIC BLOOD PRESSURE: 126 MMHG | RESPIRATION RATE: 18 BRPM | OXYGEN SATURATION: 100 % | DIASTOLIC BLOOD PRESSURE: 80 MMHG

## 2021-10-24 RX ADMIN — Medication 600 MILLIGRAM(S): at 13:30

## 2021-10-24 RX ADMIN — Medication 600 MILLIGRAM(S): at 12:39

## 2021-10-24 RX ADMIN — Medication 975 MILLIGRAM(S): at 10:30

## 2021-10-24 RX ADMIN — Medication 975 MILLIGRAM(S): at 09:38

## 2021-10-24 RX ADMIN — Medication 600 MILLIGRAM(S): at 00:41

## 2021-10-24 RX ADMIN — Medication 600 MILLIGRAM(S): at 06:16

## 2021-10-24 RX ADMIN — Medication 600 MILLIGRAM(S): at 01:30

## 2021-10-24 NOTE — PROGRESS NOTE ADULT - SUBJECTIVE AND OBJECTIVE BOX
Pt comfortable  150, min/mod variabilty  decelerations x2 noted  CTX irregular/inverted - toco adjusted  Cat2 induction, Continue  left lateral O2
S: Patient doing well. Minimal lochia. Pain controlled.  Post Partum day : 1  O: Vital Signs Last 24 Hrs  T(C): 36.4 (23 Oct 2021 05:47), Max: 37.0 (22 Oct 2021 12:12)  T(F): 97.6 (23 Oct 2021 05:47), Max: 98.6 (22 Oct 2021 12:12)  HR: 79 (23 Oct 2021 05:47) (64 - 118)  BP: 120/80 (23 Oct 2021 05:47) (106/68 - 141/92)  BP(mean): --  RR: 17 (23 Oct 2021 05:47) (16 - 17)  SpO2: 99% (23 Oct 2021 05:47) (79% - 100%)    Gen: No acute distress  Abd: soft, NT,  fundus firm below umbilicus  Lochia: Normal  Ext: no tenderness    Labs:                        11.8   6.81  )-----------( 170      ( 22 Oct 2021 07:53 )             34.7       A: 31y PPD # 1 s/p  doing well.    Plan: Discharge home
This patient has a hx of CHTN.  She stated that she has a Blood Pressure Monitor at home. The criteria for monitoring and reporting her Blood Pressure's were reviewed with the patient who verbalized understanding of the information given.  Continue routine Postpartum Care.
OB Progress Note:  PPD#2    S: 31y PPD#2 s/p  c/b cHtn. Patient feels well. Pain is well controlled, tolerating regular diet, passing flatus, voiding spontaneously, and ambulating without difficulty. Denies significant vaginal bleeding, chest pain, shortness of breath, light-headedness/ dizziness, or n/v. Denies headaches or scotomas     O:  Vitals:   Vital Signs Last 24 Hrs  T(C): 36.6 (24 Oct 2021 06:24), Max: 36.6 (23 Oct 2021 17:15)  T(F): 97.8 (24 Oct 2021 06:24), Max: 97.9 (23 Oct 2021 17:15)  HR: 77 (24 Oct 2021 06:24) (77 - 85)  BP: 115/71 (24 Oct 2021 06:24) (115/71 - 121/81)  BP(mean): --  RR: 17 (24 Oct 2021 06:24) (17 - 17)  SpO2: 99% (24 Oct 2021 06:24) (99% - 99%)    MEDICATIONS  (STANDING):  acetaminophen     Tablet .. 975 milliGRAM(s) Oral <User Schedule>  famotidine    Tablet 20 milliGRAM(s) Oral daily  ibuprofen  Tablet. 600 milliGRAM(s) Oral every 6 hours  influenza   Vaccine 0.5 milliLiter(s) IntraMuscular once  prenatal multivitamin 1 Tablet(s) Oral daily  sodium chloride 0.9% lock flush 3 milliLiter(s) IV Push every 8 hours    MEDICATIONS  (PRN):  benzocaine 20%/menthol 0.5% Spray 1 Spray(s) Topical every 6 hours PRN for Perineal discomfort  dibucaine 1% Ointment 1 Application(s) Topical every 6 hours PRN Perineal discomfort  diphenhydrAMINE 25 milliGRAM(s) Oral every 6 hours PRN Pruritus  hydrocortisone 1% Cream 1 Application(s) Topical every 6 hours PRN Moderate Pain (4-6)  lanolin Ointment 1 Application(s) Topical every 6 hours PRN nipple soreness  magnesium hydroxide Suspension 30 milliLiter(s) Oral two times a day PRN Constipation  oxyCODONE    IR 5 milliGRAM(s) Oral every 3 hours PRN Moderate to Severe Pain (4-10)  oxyCODONE    IR 5 milliGRAM(s) Oral once PRN Moderate to Severe Pain (4-10)  pramoxine 1%/zinc 5% Cream 1 Application(s) Topical every 4 hours PRN Moderate Pain (4-6)  simethicone 80 milliGRAM(s) Chew every 4 hours PRN Gas  witch hazel Pads 1 Application(s) Topical every 4 hours PRN Perineal discomfort      Labs:  Blood type: AB Positive  Rubella IgG: RPR: Negative                          11.8   6.81 >-----------< 170    ( 10-22 @ 07:53 )             34.7                        11.1<L>   7.57 >-----------< 180    ( 10-21 @ 22:17 )             33.7<L>    10-22-21 @ 07:53      137  |  105  |  4<L>  ----------------------------<  105<H>  3.7   |  21<L>  |  0.52    10-21-21 @ 22:17      136  |  104  |  6<L>  ----------------------------<  180<H>  3.8   |  19<L>  |  0.50        Ca    9.7      22 Oct 2021 07:53  Ca    9.2      21 Oct 2021 22:17    TPro  6.8  /  Alb  3.7  /  TBili  0.2  /  DBili  x   /  AST  15  /  ALT  7   /  AlkPhos  159<H>  10-22-21 @ 07:53  TPro  7.1  /  Alb  3.7  /  TBili  <0.2  /  DBili  x   /  AST  15  /  ALT  9   /  AlkPhos  142<H>  10-21-21 @ 22:17          Physical Exam:  General: No acute distress  Respiratory: No respiratory distress. Unlabored breathing   Abdomen: Soft. Non-tender. Non-distended. Fundus is firm   Extremities: No pitting edema or calf tenderness bilaterally      
S: Patient doing well. Minimal lochia. Pain controlled. Bottlefeeding  O: Vital Signs Last 24 Hrs  T(C): 36.6 (23 Oct 2021 17:15), Max: 36.6 (23 Oct 2021 17:15)  T(F): 97.9 (23 Oct 2021 17:15), Max: 97.9 (23 Oct 2021 17:15)  HR: 85 (23 Oct 2021 17:15) (85 - 85)  BP: 121/81 (23 Oct 2021 17:15) (121/81 - 121/81)  BP(mean): --  RR: 17 (23 Oct 2021 17:15) (17 - 17)  SpO2: 99% (23 Oct 2021 17:15) (99% - 99%)    Gen: NAD  Abd: soft, NT, ND, fundus firm below umbilicus  Lochia: moderate  Ext: no tenderness    Labs:                        11.8   6.81  )-----------( 170      ( 22 Oct 2021 07:53 )             34.7       A: 31y PPD#1 s/p  doing well.     Plan: Continue routine postpartum care. Anticipate d/c home today

## 2021-10-24 NOTE — PROGRESS NOTE ADULT - ASSESSMENT
A/P: 31y PPD#2 s/p  c/b cHtn. Patient is stable and doing well post-partum.    - Pain well controlled, continue current pain regimen  - Increase ambulation, SCDs when not ambulating  - Continue regular diet  - Pressures 110s-120s/60s-80s  - Discharge planning       Jovani Cowan, PGY-1  Ob/Gyn

## 2021-11-17 ENCOUNTER — EMERGENCY (EMERGENCY)
Facility: HOSPITAL | Age: 31
LOS: 1 days | Discharge: HOME | End: 2021-11-17
Attending: STUDENT IN AN ORGANIZED HEALTH CARE EDUCATION/TRAINING PROGRAM
Payer: COMMERCIAL

## 2021-11-17 VITALS
SYSTOLIC BLOOD PRESSURE: 127 MMHG | RESPIRATION RATE: 15 BRPM | OXYGEN SATURATION: 100 % | DIASTOLIC BLOOD PRESSURE: 81 MMHG | HEART RATE: 81 BPM

## 2021-11-17 VITALS
HEIGHT: 67 IN | TEMPERATURE: 99 F | RESPIRATION RATE: 18 BRPM | SYSTOLIC BLOOD PRESSURE: 143 MMHG | DIASTOLIC BLOOD PRESSURE: 101 MMHG | HEART RATE: 95 BPM | WEIGHT: 199.96 LBS | OXYGEN SATURATION: 100 %

## 2021-11-17 DIAGNOSIS — Z98.890 OTHER SPECIFIED POSTPROCEDURAL STATES: Chronic | ICD-10-CM

## 2021-11-17 LAB
ALBUMIN SERPL ELPH-MCNC: 4.5 G/DL — SIGNIFICANT CHANGE UP (ref 3.3–5.2)
ALP SERPL-CCNC: 165 U/L — HIGH (ref 40–120)
ALT FLD-CCNC: 12 U/L — SIGNIFICANT CHANGE UP
ANION GAP SERPL CALC-SCNC: 12 MMOL/L — SIGNIFICANT CHANGE UP (ref 5–17)
APTT BLD: 29.8 SEC — SIGNIFICANT CHANGE UP (ref 27.5–35.5)
AST SERPL-CCNC: 15 U/L — SIGNIFICANT CHANGE UP
BASOPHILS # BLD AUTO: 0.04 K/UL — SIGNIFICANT CHANGE UP (ref 0–0.2)
BASOPHILS NFR BLD AUTO: 0.6 % — SIGNIFICANT CHANGE UP (ref 0–2)
BILIRUB SERPL-MCNC: <0.2 MG/DL — LOW (ref 0.4–2)
BUN SERPL-MCNC: 13.2 MG/DL — SIGNIFICANT CHANGE UP (ref 8–20)
CALCIUM SERPL-MCNC: 9.8 MG/DL — SIGNIFICANT CHANGE UP (ref 8.6–10.2)
CHLORIDE SERPL-SCNC: 101 MMOL/L — SIGNIFICANT CHANGE UP (ref 98–107)
CO2 SERPL-SCNC: 25 MMOL/L — SIGNIFICANT CHANGE UP (ref 22–29)
CREAT SERPL-MCNC: 0.64 MG/DL — SIGNIFICANT CHANGE UP (ref 0.5–1.3)
D DIMER BLD IA.RAPID-MCNC: 795 NG/ML DDU — HIGH
EOSINOPHIL # BLD AUTO: 0 K/UL — SIGNIFICANT CHANGE UP (ref 0–0.5)
EOSINOPHIL NFR BLD AUTO: 0 % — SIGNIFICANT CHANGE UP (ref 0–6)
GLUCOSE SERPL-MCNC: 77 MG/DL — SIGNIFICANT CHANGE UP (ref 70–99)
HCT VFR BLD CALC: 40.7 % — SIGNIFICANT CHANGE UP (ref 34.5–45)
HGB BLD-MCNC: 13.5 G/DL — SIGNIFICANT CHANGE UP (ref 11.5–15.5)
IMM GRANULOCYTES NFR BLD AUTO: 0.3 % — SIGNIFICANT CHANGE UP (ref 0–1.5)
INR BLD: 0.99 RATIO — SIGNIFICANT CHANGE UP (ref 0.88–1.16)
LYMPHOCYTES # BLD AUTO: 1.59 K/UL — SIGNIFICANT CHANGE UP (ref 1–3.3)
LYMPHOCYTES # BLD AUTO: 22.4 % — SIGNIFICANT CHANGE UP (ref 13–44)
MCHC RBC-ENTMCNC: 32.4 PG — SIGNIFICANT CHANGE UP (ref 27–34)
MCHC RBC-ENTMCNC: 33.2 GM/DL — SIGNIFICANT CHANGE UP (ref 32–36)
MCV RBC AUTO: 97.6 FL — SIGNIFICANT CHANGE UP (ref 80–100)
MONOCYTES # BLD AUTO: 0.51 K/UL — SIGNIFICANT CHANGE UP (ref 0–0.9)
MONOCYTES NFR BLD AUTO: 7.2 % — SIGNIFICANT CHANGE UP (ref 2–14)
NEUTROPHILS # BLD AUTO: 4.93 K/UL — SIGNIFICANT CHANGE UP (ref 1.8–7.4)
NEUTROPHILS NFR BLD AUTO: 69.5 % — SIGNIFICANT CHANGE UP (ref 43–77)
NT-PROBNP SERPL-SCNC: <5 PG/ML — SIGNIFICANT CHANGE UP (ref 0–300)
PLATELET # BLD AUTO: 206 K/UL — SIGNIFICANT CHANGE UP (ref 150–400)
POTASSIUM SERPL-MCNC: 4.7 MMOL/L — SIGNIFICANT CHANGE UP (ref 3.5–5.3)
POTASSIUM SERPL-SCNC: 4.7 MMOL/L — SIGNIFICANT CHANGE UP (ref 3.5–5.3)
PROT SERPL-MCNC: 7.8 G/DL — SIGNIFICANT CHANGE UP (ref 6.6–8.7)
PROTHROM AB SERPL-ACNC: 11.5 SEC — SIGNIFICANT CHANGE UP (ref 10.6–13.6)
RBC # BLD: 4.17 M/UL — SIGNIFICANT CHANGE UP (ref 3.8–5.2)
RBC # FLD: 12.1 % — SIGNIFICANT CHANGE UP (ref 10.3–14.5)
SODIUM SERPL-SCNC: 138 MMOL/L — SIGNIFICANT CHANGE UP (ref 135–145)
TROPONIN T SERPL-MCNC: <0.01 NG/ML — SIGNIFICANT CHANGE UP (ref 0–0.06)
WBC # BLD: 7.09 K/UL — SIGNIFICANT CHANGE UP (ref 3.8–10.5)
WBC # FLD AUTO: 7.09 K/UL — SIGNIFICANT CHANGE UP (ref 3.8–10.5)

## 2021-11-17 PROCEDURE — 71045 X-RAY EXAM CHEST 1 VIEW: CPT | Mod: 26

## 2021-11-17 PROCEDURE — 93010 ELECTROCARDIOGRAM REPORT: CPT

## 2021-11-17 PROCEDURE — 99285 EMERGENCY DEPT VISIT HI MDM: CPT

## 2021-11-17 RX ORDER — MORPHINE SULFATE 50 MG/1
1 CAPSULE, EXTENDED RELEASE ORAL ONCE
Refills: 0 | Status: DISCONTINUED | OUTPATIENT
Start: 2021-11-17 | End: 2021-11-17

## 2021-11-17 RX ORDER — KETOROLAC TROMETHAMINE 30 MG/ML
15 SYRINGE (ML) INJECTION ONCE
Refills: 0 | Status: DISCONTINUED | OUTPATIENT
Start: 2021-11-17 | End: 2021-11-17

## 2021-11-17 RX ORDER — LABETALOL HCL 100 MG
100 TABLET ORAL ONCE
Refills: 0 | Status: DISCONTINUED | OUTPATIENT
Start: 2021-11-17 | End: 2021-11-17

## 2021-11-17 RX ORDER — ASPIRIN/CALCIUM CARB/MAGNESIUM 324 MG
325 TABLET ORAL DAILY
Refills: 0 | Status: DISCONTINUED | OUTPATIENT
Start: 2021-11-17 | End: 2021-11-22

## 2021-11-17 RX ADMIN — Medication 15 MILLIGRAM(S): at 22:01

## 2021-11-17 RX ADMIN — Medication 15 MILLIGRAM(S): at 22:00

## 2021-11-17 RX ADMIN — Medication 325 MILLIGRAM(S): at 20:56

## 2021-11-17 NOTE — ED PROVIDER NOTE - PROGRESS NOTE DETAILS
Conversation had with patient regarding results of negative labwork and imaging. Patient agrees with plan for discharge at this time. Patient agrees to comply with follow up with her OB and PCP. Return to ED precautions and discharge instructions given to patient.

## 2021-11-17 NOTE — ED ADULT NURSE NOTE - OBJECTIVE STATEMENT
patient A&Ox3 in no acute distress c/o od chest pain SOB , back pain , patient had a delivery 1 month ago

## 2021-11-17 NOTE — ED ADULT NURSE REASSESSMENT NOTE - NS ED NURSE REASSESS COMMENT FT1
patient still c/o of back pain chest tightness and SOB , Dr Fuentes aware Dr Ramírez  by bed side evaluating the patient no other orders at this time

## 2021-11-17 NOTE — ED ADULT NURSE REASSESSMENT NOTE - NS ED NURSE REASSESS COMMENT FT1
patient A&Ox3 in no acute distress looks more comfortable still c/o of back pain no difficulty breathing at this time keep patient on cardiac monitor continue to monitor

## 2021-11-17 NOTE — ED PROVIDER NOTE - OBJECTIVE STATEMENT
Patient is a 30yo F presenting with chest pressure that started yesterday. Patient states that she is about 3 weeks post partum from an unremarkable delivery. Patient reports that her chest pain is constant, describes it as pleuritic, squeezing, pressure, tightness. She denies any headache, cough, fever, leg swelling. Patient notes a history of HTN and was on labetalol. She reports that she was told to stop taking the labetalol after her delivery and she has a f/u appt with her GYN coming up soon. She denies FH of cardiac disease, denies PSH, other PMH.

## 2021-11-17 NOTE — ED PROVIDER NOTE - PHYSICAL EXAMINATION
General: Uncomfortable appearing female in no acute distress  HEENT: Normocephalic, atraumatic. Moist mucous membranes.  Eyes: No scleral icterus. No conjunctival pallor. EOMI. SHANNON.  Neck: Soft and supple. Full ROM without pain. No midline tenderness  Cardiac: Regular rate and regular rhythm. No murmurs. No LE edema.  Resp: Lungs CTAB. No wheezes, rales or rhonchi.  Abd: Soft, non-tender, non-distended. No guarding or rebound.  Back: Spine midline and non-tender. No CVA tenderness.    Skin: No rashes, abrasions, or lacerations.  Neuro: AO x 3. Moves all extremities symmetrically.

## 2021-11-17 NOTE — ED PROVIDER NOTE - NS ED ROS FT
General: Denies fever, chills  HEENT: Denies visual changes, sore throat  Neck: Denies neck pain, neck stiffness  Resp: Denies coughing, increased sputum production  Cardiovascular: Endorses chest pressure, shortness of breath  GI: Denies abdominal pain, nausea, vomiting, diarrhea  : Denies dysuria, hematuria, incontinence  MSK: Denies back pain. Denies leg swelling  Neuro: Denies HA, dizziness, numbness, weakness  Skin: Denies rashes

## 2021-11-17 NOTE — ED PROVIDER NOTE - EKG #1 DATE/TIME
18-Nov-2021 01:52 Please follow up with your cardiologist. Return to ED if worsening symptoms or other concerning symptoms.

## 2021-11-17 NOTE — ED PROVIDER NOTE - ATTENDING CONTRIBUTION TO CARE
I, Silvina Fuentes, have personally seen and examined this patient. I have fully participated in the care of this patient. I have reviewed all pertinent clinical information, including history, physical exam, plan and the Resident's note and agree except as noted below.     Pt with left sided pleuritic chest pain, 3 weeks postpartum, has chronic HTN hasn't been taking her labetalol due to miscommunication. no fevers. no orthopnea. not relieved  by position change 10/10. no radiation. no leg swelling. no HA/vision changes. not breast pain. no issues breastfeeding/pumping     Gen: uncomfortable/tearful, AOx3  Head: NCAT  HEENT: EOMI, oral mucosa moist, normal conjunctiva, neck supple  Lung: CTAB, no respiratory distress  CV: rrr, no murmur, Normal perfusion, no reproducible chest wall pain, no breast ttp/masses   Abd: soft, NTND  MSK: No edema, no visible deformities  Neuro: No focal neurologic deficits  Skin: No rash   Psych: normal affect     patient with pleuritic CP, noted to be mildlty HTN but no other concerning sx/signs preeclampsia and patient hasn't been on her meds. will check labs. d-dimer. tx pain and reasses

## 2021-11-17 NOTE — ED PROVIDER NOTE - CLINICAL SUMMARY MEDICAL DECISION MAKING FREE TEXT BOX
Patient presenting with HTN and chest pressure with associated SOB from home x1 days. Will evaluate with cxr, labwork, ekg, dimer, troponin. Give home dose of labetalol for BP control.

## 2021-11-17 NOTE — ED PROVIDER NOTE - NS_EDPROVIDERDISPOUSERTYPE_ED_A_ED
OUTSIDE FILMS REQUEST - INFORMATION FORM    Patient Name:  Poppy León    MRN:  [id number]    Patient YOB: 1971        [place]/Other Name:         Films Requested for an imaging appointment at [place] on 10-5-20    Name of Facility Exam Requested From: [facility]     Address:     City/State/Zip:      Phone #:       Fax #: [phone]    Date and Type of films/images to be released:  CT ABDOMEN PELVIS        Include Reports:  No      Please send  (  ) CD - Only digital images performed at this site.     (  ) Films - Only films (Analog) performed at this site.     ( X )  VPN    Send images to:   [facility]     ATTN: Imaging Department     [facility] [place] [address]     (T) [phone]     (F) [phone]    
Attending Attestation (For Attendings USE Only)...

## 2021-11-17 NOTE — ED PROVIDER NOTE - NSFOLLOWUPINSTRUCTIONS_ED_ALL_ED_FT
Chest Pain    Chest pain can be caused by many different conditions which may or may not be dangerous. Causes include heartburn, lung infections, heart attack, blood clot in lungs, skin infections, strain or damage to muscle, cartilage, or bones, etc. In addition to a history and physical examination, an electrocardiogram (ECG) or other lab tests may have been performed to determine the cause of your chest pain. Follow up with your primary care provider or with a cardiologist as instructed.     SEEK IMMEDIATE MEDICAL CARE IF YOU HAVE ANY OF THE FOLLOWING SYMPTOMS: worsening chest pain, coughing up blood, unexplained back/neck/jaw pain, severe abdominal pain, dizziness or lightheadedness, fainting, shortness of breath, sweaty or clammy skin, vomiting, or racing heart beat. These symptoms may represent a serious problem that is an emergency. Do not wait to see if the symptoms will go away. Get medical help right away. Call 911 and do not drive yourself to the hospital.      WHAT YOU NEED TO KNOW:    Pleurisy happens when the pleura becomes irritated or swollen. The pleura is a thin piece of tissue made of 2 layers. One layer lines the inside of your chest cavity, and the other surrounds your lungs. There is a small amount of fluid between the layers that helps them move easily when you breathe. When the pleura is irritated or swollen, the layers rub together as you breathe.     The Lungs         DISCHARGE INSTRUCTIONS:    Call your local emergency number (911 in the ) if:   •You have sudden, intense chest pain that feels different from your symptoms.      •You are breathing fast, feel confused, or feel like you are going to faint.      Seek care immediately if:   •You cough up blood.      •You feel more short of breath than usual.      •Your lips or fingernails turn dusky or blue.      Call your doctor if:   •Your pain gets worse, even after treatment.      •You have a fever.      •You have questions or concerns about your condition or care.      Medicines: You may need any of the following:  •Cough medicine helps decrease your urge to cough. A cough suppressant may help if a dry cough is causing your pain.      •Antibiotics may treat pleurisy caused by bacteria.      •Steroids may be given to decrease inflammation.      •Prescription pain medicine may be given if other pain medicines do not work. Do not wait until the pain is severe before you ask for more medicine.      •You may need extra oxygen if your blood oxygen level is lower than it should be. You may get oxygen through a mask placed over your nose and mouth or through small tubes placed in your nostrils. Ask your healthcare provider before you take off the mask or oxygen tubing.       •NSAIDs, such as ibuprofen, help decrease swelling, pain, and fever. This medicine is available with or without a doctor's order. NSAIDs can cause stomach bleeding or kidney problems in certain people. If you take blood thinner medicine, always ask if NSAIDs are safe for you. Always read the medicine label and follow directions. Do not give these medicines to children under 6 months of age without direction from your child's healthcare provider.      •Prescription pain medicine may be given. Ask your healthcare provider how to take this medicine safely. Some prescription pain medicines contain acetaminophen. Do not take other medicines that contain acetaminophen without talking to your healthcare provider. Too much acetaminophen may cause liver damage. Prescription pain medicine may cause constipation. Ask your healthcare provider how to prevent or treat constipation.       •Take your medicine as directed. Contact your healthcare provider if you think your medicine is not helping or if you have side effects. Tell him or her if you are allergic to any medicine. Keep a list of the medicines, vitamins, and herbs you take. Include the amounts, and when and why you take them. Bring the list or the pill bottles to follow-up visits. Carry your medicine list with you in case of an emergency.

## 2021-11-17 NOTE — ED PROVIDER NOTE - PATIENT PORTAL LINK FT
You can access the FollowMyHealth Patient Portal offered by [facility] by registering at the following website: [email or web address]. By joining [employer]’s FollowMyHealth portal, you will also be able to view your health information using other applications (apps) compatible with our system.

## 2021-11-18 PROBLEM — R00.2 PALPITATIONS: Chronic | Status: ACTIVE | Noted: 2021-10-21

## 2021-11-18 PROCEDURE — 96374 THER/PROPH/DIAG INJ IV PUSH: CPT | Mod: XU

## 2021-11-18 PROCEDURE — 71275 CT ANGIOGRAPHY CHEST: CPT | Mod: 26,MA

## 2021-11-18 PROCEDURE — 85025 COMPLETE CBC W/AUTO DIFF WBC: CPT

## 2021-11-18 PROCEDURE — 36415 COLL VENOUS BLD VENIPUNCTURE: CPT

## 2021-11-18 PROCEDURE — 71275 CT ANGIOGRAPHY CHEST: CPT | Mod: MA

## 2021-11-18 PROCEDURE — 80053 COMPREHEN METABOLIC PANEL: CPT

## 2021-11-18 PROCEDURE — 85379 FIBRIN DEGRADATION QUANT: CPT

## 2021-11-18 PROCEDURE — 85730 THROMBOPLASTIN TIME PARTIAL: CPT

## 2021-11-18 PROCEDURE — 85610 PROTHROMBIN TIME: CPT

## 2021-11-18 PROCEDURE — 83880 ASSAY OF NATRIURETIC PEPTIDE: CPT

## 2021-11-18 PROCEDURE — 96375 TX/PRO/DX INJ NEW DRUG ADDON: CPT | Mod: XU

## 2021-11-18 PROCEDURE — 71045 X-RAY EXAM CHEST 1 VIEW: CPT

## 2021-11-18 PROCEDURE — 93005 ELECTROCARDIOGRAM TRACING: CPT

## 2021-11-18 PROCEDURE — 84484 ASSAY OF TROPONIN QUANT: CPT

## 2021-11-18 PROCEDURE — 84702 CHORIONIC GONADOTROPIN TEST: CPT

## 2021-11-18 PROCEDURE — 99284 EMERGENCY DEPT VISIT MOD MDM: CPT | Mod: 25

## 2021-11-18 RX ORDER — CYCLOBENZAPRINE HYDROCHLORIDE 10 MG/1
1 TABLET, FILM COATED ORAL
Qty: 3 | Refills: 0
Start: 2021-11-18 | End: 2021-11-20

## 2021-11-18 RX ADMIN — MORPHINE SULFATE 1 MILLIGRAM(S): 50 CAPSULE, EXTENDED RELEASE ORAL at 01:27

## 2021-11-18 RX ADMIN — MORPHINE SULFATE 1 MILLIGRAM(S): 50 CAPSULE, EXTENDED RELEASE ORAL at 01:05

## 2021-12-08 NOTE — ED STATDOCS - ATTENDING CONTRIBUTION TO CARE
0 I personally saw the patient with the ACP, and completed the key components of the history and physical exam. I then discussed the management plan with the ACP.

## 2021-12-13 DIAGNOSIS — O99.013 ANEMIA COMPLICATING PREGNANCY, THIRD TRIMESTER: ICD-10-CM

## 2021-12-13 DIAGNOSIS — O10.013 PRE-EXISTING ESSENTIAL HYPERTENSION COMPLICATING PREGNANCY, THIRD TRIMESTER: ICD-10-CM

## 2021-12-13 DIAGNOSIS — O36.8330 MATERNAL CARE FOR ABNORMALITIES OF THE FETAL HEART RATE OR RHYTHM, THIRD TRIMESTER, NOT APPLICABLE OR UNSPECIFIED: ICD-10-CM

## 2021-12-13 DIAGNOSIS — O26.843 UTERINE SIZE-DATE DISCREPANCY, THIRD TRIMESTER: ICD-10-CM

## 2021-12-13 PROBLEM — Z78.9 OTHER SPECIFIED HEALTH STATUS: Chronic | Status: INACTIVE | Noted: 2021-08-08 | Resolved: 2021-10-21

## 2022-04-22 NOTE — ED ADULT NURSE NOTE - CAS ELECT INFOMATION PROVIDED
Presents c/o sore throat x 2 weeks with clear nasal drainage and slight cough. Triaged exam room 5. Throat red no white patches. Flu and strep obtained.
DC instructions

## 2023-05-18 ENCOUNTER — APPOINTMENT (OUTPATIENT)
Dept: OBGYN | Facility: CLINIC | Age: 33
End: 2023-05-18
Payer: MEDICAID

## 2023-05-18 VITALS
WEIGHT: 197.1 LBS | HEIGHT: 67 IN | SYSTOLIC BLOOD PRESSURE: 130 MMHG | BODY MASS INDEX: 30.93 KG/M2 | DIASTOLIC BLOOD PRESSURE: 88 MMHG

## 2023-05-18 DIAGNOSIS — Z86.79 PERSONAL HISTORY OF OTHER DISEASES OF THE CIRCULATORY SYSTEM: ICD-10-CM

## 2023-05-18 PROCEDURE — 99214 OFFICE O/P EST MOD 30 MIN: CPT

## 2023-05-18 NOTE — PHYSICAL EXAM
[Chaperone Present] : A chaperone was present in the examining room during all aspects of the physical examination [FreeTextEntry1] : elier [Appropriately responsive] : appropriately responsive [Alert] : alert [No Acute Distress] : no acute distress [No Lymphadenopathy] : no lymphadenopathy [Regular Rate Rhythm] : regular rate rhythm [No Murmurs] : no murmurs [Clear to Auscultation B/L] : clear to auscultation bilaterally [Soft] : soft [Non-tender] : non-tender [Non-distended] : non-distended [No HSM] : No HSM [No Lesions] : no lesions [No Mass] : no mass [Oriented x3] : oriented x3 [Examination Of The Breasts] : a normal appearance [No Masses] : no breast masses were palpable [Labia Majora] : normal [Labia Minora] : normal [Normal] : normal [Uterine Adnexae] : normal

## 2023-05-18 NOTE — HISTORY OF PRESENT ILLNESS
[N] : Patient does not use contraception [Y] : Positive pregnancy history [Regular Cycle Intervals] : periods have been regular [Frequency: Q ___ days] : menstrual periods occur approximately every [unfilled] days [Menarche Age: ____] : age at menarche was [unfilled] [PGHxTotal] : 5 [[facility]] : 2 [PGHxPremature] : 0 [PGHxAbortions] : 3 [[facility]] : 2 [PGHxABInduced] : 2 [PGHxABSpont] : 1 [PGHxEctopic] : 0 [PGHxMultBirths] : 0

## 2023-05-19 LAB
C TRACH RRNA SPEC QL NAA+PROBE: NOT DETECTED
ESTIMATED AVERAGE GLUCOSE: 94 MG/DL
FSH SERPL-MCNC: 2.8 IU/L
HBA1C MFR BLD HPLC: 4.9 %
HPV HIGH+LOW RISK DNA PNL CVX: NOT DETECTED
LH SERPL-ACNC: 5.5 IU/L
N GONORRHOEA RRNA SPEC QL NAA+PROBE: NOT DETECTED
PROLACTIN SERPL-MCNC: 9.8 NG/ML
SOURCE TP AMPLIFICATION: NORMAL
T3FREE SERPL-MCNC: 3.03 PG/ML
T4 FREE SERPL-MCNC: 1.1 NG/DL
TESTOST SERPL-MCNC: 30.4 NG/DL
TSH SERPL-ACNC: 0.67 UIU/ML

## 2023-05-23 LAB — CYTOLOGY CVX/VAG DOC THIN PREP: NORMAL

## 2023-06-13 ENCOUNTER — APPOINTMENT (OUTPATIENT)
Dept: OBGYN | Facility: CLINIC | Age: 33
End: 2023-06-13

## 2023-07-17 ENCOUNTER — APPOINTMENT (OUTPATIENT)
Dept: OBGYN | Facility: CLINIC | Age: 33
End: 2023-07-17
Payer: MEDICAID

## 2023-07-17 VITALS
WEIGHT: 197.13 LBS | HEIGHT: 67 IN | SYSTOLIC BLOOD PRESSURE: 128 MMHG | BODY MASS INDEX: 30.94 KG/M2 | DIASTOLIC BLOOD PRESSURE: 80 MMHG

## 2023-07-17 DIAGNOSIS — L68.0 HIRSUTISM: ICD-10-CM

## 2023-07-17 PROCEDURE — 99213 OFFICE O/P EST LOW 20 MIN: CPT

## 2023-07-17 NOTE — COUNSELING
[Body Image] : body image [Nutrition/ Exercise/ Weight Management] : nutrition, exercise, weight management [Vitamins/Supplements] : vitamins/supplements [Sunscreen] : sunscreen [Drugs/Alcohol] : drugs, alcohol [Breast Self Exam] : breast self exam [Bladder Hygiene] : bladder hygiene [Confidentiality] : confidentiality

## 2024-01-21 NOTE — OB RN PATIENT PROFILE - HEIGHT IN INCHES
IMPORTANT EVENTS THIS SHIFT:  New admit for bloody emesis, abdominal/back pains Dx: Esophagitis. Pt admitted to drinking before coming to ER then started to vomit blood, alcohol level 128 placed on CIWA protocol, A&Ox3, room air.   Zofran given 1x, no vomiting   Abdominal pain managed with Bentyl, protonix, maalox, sulcrafate   Back pain managed with Lidocaine patch, dilaudid, hot packs, repositioning.   Potassium replaced  CIWA q4 next: 3rd CIWA assessment at 10am   NPO w exceptions to meds  GI placed on consult   Need stool collection for occult blood test     IMPORTANT EVENTS COMING UP/GOALS (PLEASE INCLUDE WHITE BOARD AND DISCHARGE BOARD UPDATES):   PATIENT SPECIAL NEEDS/ACCOMMODATIONS:  CIWA protocol  Seizure precautions  Tele          7

## 2024-09-18 NOTE — OB RN PATIENT PROFILE - NS_DIETPREF_OBGYN_ALL_OB
Regular [TextEntry] : 1. Routine labs: garett, CBC, CMP, glucopsychosine, UA 2. Recommend switch to VPRIV - will start at local infusion center for 3 infusions 3. Follow-up in 6 months

## 2024-10-04 NOTE — PATIENT PROFILE OB - TERM DELIVERIES, OB PROFILE
Phone call to patient to convey lab results. Patient has already ovulated so no IUI this cycle. Instructed patient to call with cycle day 1 to be mapped out for another cycle. Will forward chart to billing and they will reach out to answer any questions she may have. Encouraged patient to keep trigger injection for next cycle, as long as it does not . She verbalizes understanding and no questions at this time.    0

## 2025-03-19 ENCOUNTER — APPOINTMENT (OUTPATIENT)
Dept: OTOLARYNGOLOGY | Facility: CLINIC | Age: 35
End: 2025-03-19
Payer: MEDICAID

## 2025-03-19 ENCOUNTER — NON-APPOINTMENT (OUTPATIENT)
Age: 35
End: 2025-03-19

## 2025-03-19 VITALS
SYSTOLIC BLOOD PRESSURE: 134 MMHG | HEART RATE: 91 BPM | HEIGHT: 67 IN | DIASTOLIC BLOOD PRESSURE: 85 MMHG | BODY MASS INDEX: 31.55 KG/M2 | WEIGHT: 201 LBS

## 2025-03-19 DIAGNOSIS — R49.0 DYSPHONIA: ICD-10-CM

## 2025-03-19 DIAGNOSIS — J38.1 POLYP OF VOCAL CORD AND LARYNX: ICD-10-CM

## 2025-03-19 PROCEDURE — 99203 OFFICE O/P NEW LOW 30 MIN: CPT | Mod: 25

## 2025-03-19 PROCEDURE — 31575 DIAGNOSTIC LARYNGOSCOPY: CPT

## 2025-03-19 RX ORDER — FLUTICASONE PROPIONATE 50 UG/1
50 SPRAY, METERED NASAL DAILY
Qty: 1 | Refills: 2 | Status: ACTIVE | COMMUNITY
Start: 2025-03-19 | End: 1900-01-01

## 2025-03-19 RX ORDER — PANTOPRAZOLE 40 MG/1
40 TABLET, DELAYED RELEASE ORAL DAILY
Qty: 30 | Refills: 3 | Status: ACTIVE | COMMUNITY
Start: 2025-03-19 | End: 1900-01-01

## 2025-04-21 ENCOUNTER — APPOINTMENT (OUTPATIENT)
Dept: OTOLARYNGOLOGY | Facility: CLINIC | Age: 35
End: 2025-04-21
Payer: MEDICAID

## 2025-04-21 VITALS
SYSTOLIC BLOOD PRESSURE: 130 MMHG | WEIGHT: 200 LBS | DIASTOLIC BLOOD PRESSURE: 83 MMHG | HEIGHT: 67 IN | HEART RATE: 81 BPM | BODY MASS INDEX: 31.39 KG/M2

## 2025-04-21 DIAGNOSIS — J38.1 POLYP OF VOCAL CORD AND LARYNX: ICD-10-CM

## 2025-04-21 DIAGNOSIS — R49.0 DYSPHONIA: ICD-10-CM

## 2025-04-21 PROCEDURE — 31575 DIAGNOSTIC LARYNGOSCOPY: CPT

## 2025-04-21 PROCEDURE — 99212 OFFICE O/P EST SF 10 MIN: CPT | Mod: 25

## 2025-05-14 ENCOUNTER — APPOINTMENT (OUTPATIENT)
Dept: OBGYN | Facility: CLINIC | Age: 35
End: 2025-05-14
Payer: MEDICAID

## 2025-05-14 VITALS
HEIGHT: 67 IN | WEIGHT: 197 LBS | DIASTOLIC BLOOD PRESSURE: 84 MMHG | BODY MASS INDEX: 30.92 KG/M2 | SYSTOLIC BLOOD PRESSURE: 130 MMHG

## 2025-05-14 DIAGNOSIS — Z01.419 ENCOUNTER FOR GYNECOLOGICAL EXAMINATION (GENERAL) (ROUTINE) W/OUT ABNORMAL FINDINGS: ICD-10-CM

## 2025-05-14 DIAGNOSIS — Z00.00 ENCOUNTER FOR GENERAL ADULT MEDICAL EXAMINATION W/OUT ABNORMAL FINDINGS: ICD-10-CM

## 2025-05-14 DIAGNOSIS — Z80.9 FAMILY HISTORY OF MALIGNANT NEOPLASM, UNSPECIFIED: ICD-10-CM

## 2025-05-14 PROCEDURE — 99395 PREV VISIT EST AGE 18-39: CPT | Mod: 25

## 2025-05-14 PROCEDURE — 99459 PELVIC EXAMINATION: CPT

## 2025-05-14 PROCEDURE — 99214 OFFICE O/P EST MOD 30 MIN: CPT | Mod: 25

## 2025-05-15 LAB
C TRACH RRNA SPEC QL NAA+PROBE: NOT DETECTED
HBV SURFACE AG SER QL: NONREACTIVE
HIV1+2 AB SPEC QL IA.RAPID: NONREACTIVE
HPV HIGH+LOW RISK DNA PNL CVX: NOT DETECTED
N GONORRHOEA RRNA SPEC QL NAA+PROBE: NOT DETECTED
SOURCE TP AMPLIFICATION: NORMAL
T PALLIDUM AB SER QL IA: NEGATIVE

## 2025-05-16 LAB
HCV AB SER QL: NONREACTIVE
HCV S/CO RATIO: 0.15 S/CO
HSV 1+2 IGG SER IA-IMP: NEGATIVE
HSV 1+2 IGG SER IA-IMP: NEGATIVE
HSV1 IGG SER QL: <0.01 INDEX
HSV2 IGG SER QL: 0.35 INDEX

## 2025-05-19 ENCOUNTER — APPOINTMENT (OUTPATIENT)
Dept: OTOLARYNGOLOGY | Facility: CLINIC | Age: 35
End: 2025-05-19

## 2025-05-19 LAB — CYTOLOGY CVX/VAG DOC THIN PREP: NORMAL

## 2025-07-02 ENCOUNTER — RX RENEWAL (OUTPATIENT)
Age: 35
End: 2025-07-02